# Patient Record
Sex: MALE | Race: OTHER
[De-identification: names, ages, dates, MRNs, and addresses within clinical notes are randomized per-mention and may not be internally consistent; named-entity substitution may affect disease eponyms.]

---

## 2017-03-31 ENCOUNTER — HOSPITAL ENCOUNTER (OUTPATIENT)
Dept: HOSPITAL 23 - CGUS | Age: 27
Discharge: HOME | End: 2017-03-31
Payer: COMMERCIAL

## 2017-03-31 DIAGNOSIS — R74.8: Primary | ICD-10-CM

## 2019-02-04 ENCOUNTER — OFFICE VISIT (OUTPATIENT)
Dept: FAMILY MEDICINE CLINIC | Facility: CLINIC | Age: 29
End: 2019-02-04

## 2019-02-04 VITALS
SYSTOLIC BLOOD PRESSURE: 128 MMHG | OXYGEN SATURATION: 98 % | HEIGHT: 68 IN | TEMPERATURE: 98.6 F | HEART RATE: 84 BPM | WEIGHT: 182.4 LBS | RESPIRATION RATE: 16 BRPM | DIASTOLIC BLOOD PRESSURE: 66 MMHG | BODY MASS INDEX: 27.65 KG/M2

## 2019-02-04 DIAGNOSIS — R00.2 HEART PALPITATIONS: ICD-10-CM

## 2019-02-04 DIAGNOSIS — Z20.2 POSSIBLE EXPOSURE TO STD: Primary | ICD-10-CM

## 2019-02-04 DIAGNOSIS — Z13.220 SCREENING FOR HYPERLIPIDEMIA: ICD-10-CM

## 2019-02-04 DIAGNOSIS — R94.31 ABNORMAL EKG: ICD-10-CM

## 2019-02-04 PROCEDURE — 99204 OFFICE O/P NEW MOD 45 MIN: CPT | Performed by: NURSE PRACTITIONER

## 2019-02-04 PROCEDURE — 93000 ELECTROCARDIOGRAM COMPLETE: CPT | Performed by: NURSE PRACTITIONER

## 2019-02-04 RX ORDER — MELATONIN
1000 DAILY
COMMUNITY

## 2019-02-04 RX ORDER — MULTIPLE VITAMINS W/ MINERALS TAB 9MG-400MCG
1 TAB ORAL DAILY
COMMUNITY

## 2019-02-04 RX ORDER — CHLORAL HYDRATE 500 MG
CAPSULE ORAL
COMMUNITY

## 2019-02-04 NOTE — PROGRESS NOTES
Subjective   Elva Jasmine is a 28 y.o. male.     Chief Complaint   Patient presents with   • Heart Problem     EST care      Mr. Jasmine presents today to establish care at this practice. He is concerned today because starting last Thursday (1/31/19) he has been having intermittent episodes where he feels like his heart is skipping a beat, when this happens he fills like he needs to take a deep breath but can't. He denies chest pain. He reports a significant family history of heart disease and reports a personal history of HTN which is diet controled. He is currently taking supplemental vitamins.   His other concern is that he recently ended a relationship and he reports there was infidelity involved. He would like to be evaluated for STDs.    I have reviewed the patient's medical history in detail and updated the computerized patient record.     The following portions of the patient's history were reviewed and updated as appropriate: allergies, current medications, past family history, past medical history, past social history, past surgical history and problem list.       Current Outpatient Medications:   •  cholecalciferol (VITAMIN D3) 1000 units tablet, Take 1,000 Units by mouth Daily., Disp: , Rfl:   •  Multiple Vitamins-Minerals (MULTIVITAMIN WITH MINERALS) tablet tablet, Take 1 tablet by mouth Daily., Disp: , Rfl:   •  Omega-3 Fatty Acids (FISH OIL) 1000 MG capsule capsule, Take  by mouth Daily With Breakfast., Disp: , Rfl:     Review of Systems   Constitutional: Negative.    HENT: Negative.    Respiratory: Negative.    Cardiovascular: Positive for palpitations (skipped beats). Negative for chest pain and leg swelling.   Gastrointestinal: Negative.    Genitourinary: Negative.    Musculoskeletal: Negative.    Skin: Negative.    Neurological: Negative.    Psychiatric/Behavioral: Negative.         Vitals:    02/04/19 1452   BP: 128/66   Pulse: 84   Resp: 16   Temp: 98.6 °F (37 °C)   SpO2: 98%       Objective    Physical Exam   Constitutional: He is oriented to person, place, and time. He appears well-developed and well-nourished.   HENT:   Right Ear: External ear normal.   Left Ear: External ear normal.   Mouth/Throat: Oropharynx is clear and moist.   Neck: Normal range of motion. Neck supple. No JVD present. No thyromegaly present.   Cardiovascular: Normal rate, regular rhythm, normal heart sounds and intact distal pulses.   Pulmonary/Chest: Effort normal and breath sounds normal.   Abdominal: Soft. Bowel sounds are normal.   Musculoskeletal: Normal range of motion.   Lymphadenopathy:     He has no cervical adenopathy.   Neurological: He is alert and oriented to person, place, and time.   Skin: Skin is warm and dry.   Psychiatric:   No acute distress   Vitals reviewed.        Assessment/Plan   Elva was seen today for heart problem.    Diagnoses and all orders for this visit:    Possible exposure to STD  -     Chlamydia trachomatis, Neisseria gonorrhoeae, PCR - Swab, Urine, Clean Catch  -     RPR  -     Hepatitis C RNA, quantitative, PCR (graph)  -     Hepatitis panel, acute  -     HIV 2 Antibody Screen w reflex  -     HSV 1 and 2 IgM, Abs, Indirect  -     HSV 1 and 2-Specific Ab, IgG    Heart palpitations  -     Thyroid Peroxidase Antibody  -     TSH  -     T4, Free  -     T4  -     T3, Free  -     T3  -     CBC (No Diff)  -     Comprehensive Metabolic Panel  -     ECG 12 Lead    Screening for hyperlipidemia  -     Lipid Panel With / Chol / HDL Ratio    Abnormal EKG  -     Stress Test With Myocardial Perfusion One Day; Future    1. He has not had a PCP in many years so there are no medical records to obtain.  2. I have reviewed his EKG which shows NSR with left ventricular hypertrophy and ST-T changes. I have no other EKG to compare this too. I will send him for a nuclear cardiac stress test for further evaluation.  3. I have also ordered an STD panel, thyroid studies, CMP, CBC and lipids. He is to return later this  week when he is fasting to have labs drawn.   4. Follow up as needed.

## 2019-02-08 ENCOUNTER — HOSPITAL ENCOUNTER (OUTPATIENT)
Dept: CARDIOLOGY | Facility: HOSPITAL | Age: 29
Discharge: HOME | End: 2019-02-08

## 2019-02-08 ENCOUNTER — HOSPITAL ENCOUNTER (OUTPATIENT)
Dept: CARDIOLOGY | Facility: HOSPITAL | Age: 29
Discharge: HOME | End: 2019-02-08
Admitting: NURSE PRACTITIONER

## 2019-02-08 VITALS
DIASTOLIC BLOOD PRESSURE: 80 MMHG | HEIGHT: 68 IN | HEART RATE: 79 BPM | WEIGHT: 182 LBS | BODY MASS INDEX: 27.58 KG/M2 | SYSTOLIC BLOOD PRESSURE: 112 MMHG

## 2019-02-08 DIAGNOSIS — R94.31 ABNORMAL EKG: ICD-10-CM

## 2019-02-08 DIAGNOSIS — R94.31 ABNORMAL EKG: Primary | ICD-10-CM

## 2019-02-08 DIAGNOSIS — I49.3 PVC'S (PREMATURE VENTRICULAR CONTRACTIONS): ICD-10-CM

## 2019-02-08 DIAGNOSIS — R00.2 HEART PALPITATIONS: Primary | ICD-10-CM

## 2019-02-08 LAB
ASCENDING AORTA: 2.3 CM
BH CV ECHO MEAS - ACS: 2.2 CM
BH CV ECHO MEAS - AO MAX PG: 4.8 MMHG
BH CV ECHO MEAS - AO ROOT AREA (BSA CORRECTED): 1.6
BH CV ECHO MEAS - AO ROOT AREA: 8 CM^2
BH CV ECHO MEAS - AO ROOT DIAM: 3.2 CM
BH CV ECHO MEAS - AO V2 MAX: 109.9 CM/SEC
BH CV ECHO MEAS - BSA(HAYCOCK): 2 M^2
BH CV ECHO MEAS - BSA: 2 M^2
BH CV ECHO MEAS - BZI_BMI: 27.7 KILOGRAMS/M^2
BH CV ECHO MEAS - BZI_METRIC_HEIGHT: 172.7 CM
BH CV ECHO MEAS - BZI_METRIC_WEIGHT: 82.6 KG
BH CV ECHO MEAS - EDV(MOD-SP4): 122 ML
BH CV ECHO MEAS - EDV(TEICH): 116.9 ML
BH CV ECHO MEAS - EF(CUBED): 74.6 %
BH CV ECHO MEAS - EF(MOD-BP): 70 %
BH CV ECHO MEAS - EF(MOD-SP4): 69.7 %
BH CV ECHO MEAS - EF(TEICH): 66.3 %
BH CV ECHO MEAS - ESV(MOD-SP4): 37 ML
BH CV ECHO MEAS - ESV(TEICH): 39.4 ML
BH CV ECHO MEAS - FS: 36.7 %
BH CV ECHO MEAS - IVS/LVPW: 0.96
BH CV ECHO MEAS - IVSD: 0.89 CM
BH CV ECHO MEAS - LAT PEAK E' VEL: 12 CM/SEC
BH CV ECHO MEAS - LV DIASTOLIC VOL/BSA (35-75): 62.1 ML/M^2
BH CV ECHO MEAS - LV MASS(C)D: 159.8 GRAMS
BH CV ECHO MEAS - LV MASS(C)DI: 81.4 GRAMS/M^2
BH CV ECHO MEAS - LV SYSTOLIC VOL/BSA (12-30): 18.8 ML/M^2
BH CV ECHO MEAS - LVIDD: 5 CM
BH CV ECHO MEAS - LVIDS: 3.1 CM
BH CV ECHO MEAS - LVLD AP4: 8.2 CM
BH CV ECHO MEAS - LVLS AP4: 7.5 CM
BH CV ECHO MEAS - LVPWD: 0.93 CM
BH CV ECHO MEAS - MED PEAK E' VEL: 9 CM/SEC
BH CV ECHO MEAS - MR MAX PG: 13.2 MMHG
BH CV ECHO MEAS - MR MAX VEL: 181.9 CM/SEC
BH CV ECHO MEAS - MV A DUR: 0.11 SEC
BH CV ECHO MEAS - MV A MAX VEL: 44.1 CM/SEC
BH CV ECHO MEAS - MV DEC SLOPE: 171.8 CM/SEC^2
BH CV ECHO MEAS - MV DEC TIME: 0.33 SEC
BH CV ECHO MEAS - MV E MAX VEL: 57.2 CM/SEC
BH CV ECHO MEAS - MV E/A: 1.3
BH CV ECHO MEAS - MV P1/2T MAX VEL: 58.2 CM/SEC
BH CV ECHO MEAS - MV P1/2T: 99.2 MSEC
BH CV ECHO MEAS - MVA P1/2T LCG: 3.8 CM^2
BH CV ECHO MEAS - MVA(P1/2T): 2.2 CM^2
BH CV ECHO MEAS - PULM A REVS DUR: 0.1 SEC
BH CV ECHO MEAS - PULM A REVS VEL: 23.3 CM/SEC
BH CV ECHO MEAS - PULM DIAS VEL: 45.6 CM/SEC
BH CV ECHO MEAS - PULM S/D: 1
BH CV ECHO MEAS - PULM SYS VEL: 47.1 CM/SEC
BH CV ECHO MEAS - SI(CUBED): 46.8 ML/M^2
BH CV ECHO MEAS - SI(MOD-SP4): 43.3 ML/M^2
BH CV ECHO MEAS - SI(TEICH): 39.5 ML/M^2
BH CV ECHO MEAS - SV(CUBED): 91.9 ML
BH CV ECHO MEAS - SV(MOD-SP4): 85 ML
BH CV ECHO MEAS - SV(TEICH): 77.5 ML
BH CV ECHO MEAS - TAPSE (>1.6): 2.3 CM2
BH CV ECHO MEASUREMENTS AVERAGE E/E' RATIO: 5.45
BH CV STRESS BP STAGE 1: NORMAL
BH CV STRESS BP STAGE 2: NORMAL
BH CV STRESS BP STAGE 3: NORMAL
BH CV STRESS DURATION MIN STAGE 1: 3
BH CV STRESS DURATION MIN STAGE 2: 3
BH CV STRESS DURATION MIN STAGE 3: 3
BH CV STRESS DURATION MIN STAGE 4: 1
BH CV STRESS DURATION SEC STAGE 1: 0
BH CV STRESS DURATION SEC STAGE 2: 0
BH CV STRESS DURATION SEC STAGE 3: 0
BH CV STRESS DURATION SEC STAGE 4: 0
BH CV STRESS ECHO POST STRESS EJECTION FRACTION EF: 80 %
BH CV STRESS GRADE STAGE 1: 10
BH CV STRESS GRADE STAGE 2: 12
BH CV STRESS GRADE STAGE 3: 14
BH CV STRESS GRADE STAGE 4: 16
BH CV STRESS HR STAGE 1: 106
BH CV STRESS HR STAGE 2: 123
BH CV STRESS HR STAGE 3: 172
BH CV STRESS HR STAGE 4: 181
BH CV STRESS METS STAGE 1: 5
BH CV STRESS METS STAGE 2: 7.5
BH CV STRESS METS STAGE 3: 10
BH CV STRESS METS STAGE 4: 13.5
BH CV STRESS PROTOCOL 1: NORMAL
BH CV STRESS RATE STAGE 3: 181
BH CV STRESS SPEED STAGE 1: 1.7
BH CV STRESS SPEED STAGE 2: 2.5
BH CV STRESS SPEED STAGE 3: 3.4
BH CV STRESS SPEED STAGE 4: 4.2
BH CV STRESS STAGE 1: 1
BH CV STRESS STAGE 2: 2
BH CV STRESS STAGE 3: 3
BH CV STRESS STAGE 4: 4
BH CV XLRA - RV BASE: 2.6 CM
BH CV XLRA - TDI S': 12 CM/SEC
LEFT ATRIUM VOLUME INDEX: 19 ML/M2
MAXIMAL PREDICTED HEART RATE: 190 BPM
PERCENT MAX PREDICTED HR: 95.26 %
SINUS: 2.9 CM
STJ: 2.5 CM
STRESS BASELINE BP: NORMAL MMHG
STRESS BASELINE HR: 79 BPM
STRESS PERCENT HR: 112 %
STRESS POST ESTIMATED WORKLOAD: 11 METS
STRESS POST EXERCISE DUR MIN: 10 MIN
STRESS POST EXERCISE DUR SEC: 0 SEC
STRESS POST PEAK BP: NORMAL MMHG
STRESS POST PEAK HR: 181 BPM
STRESS TARGET HR: 162 BPM

## 2019-02-08 PROCEDURE — 93017 CV STRESS TEST TRACING ONLY: CPT

## 2019-02-08 PROCEDURE — 93018 CV STRESS TEST I&R ONLY: CPT | Performed by: INTERNAL MEDICINE

## 2019-02-08 PROCEDURE — 93016 CV STRESS TEST SUPVJ ONLY: CPT | Performed by: INTERNAL MEDICINE

## 2019-02-08 PROCEDURE — 93320 DOPPLER ECHO COMPLETE: CPT

## 2019-02-08 PROCEDURE — 93325 DOPPLER ECHO COLOR FLOW MAPG: CPT

## 2019-02-08 PROCEDURE — 93325 DOPPLER ECHO COLOR FLOW MAPG: CPT | Performed by: INTERNAL MEDICINE

## 2019-02-08 PROCEDURE — 93350 STRESS TTE ONLY: CPT | Performed by: INTERNAL MEDICINE

## 2019-02-08 PROCEDURE — 93350 STRESS TTE ONLY: CPT

## 2019-02-08 PROCEDURE — 93320 DOPPLER ECHO COMPLETE: CPT | Performed by: INTERNAL MEDICINE

## 2019-02-12 LAB
ALBUMIN SERPL-MCNC: 4.6 G/DL (ref 3.5–5.5)
ALBUMIN/GLOB SERPL: 1.8 {RATIO} (ref 1.2–2.2)
ALP SERPL-CCNC: 31 IU/L (ref 39–117)
ALT SERPL-CCNC: 40 IU/L (ref 0–44)
AST SERPL-CCNC: 23 IU/L (ref 0–40)
BILIRUB SERPL-MCNC: 1.3 MG/DL (ref 0–1.2)
BUN SERPL-MCNC: 13 MG/DL (ref 6–20)
BUN/CREAT SERPL: 12 (ref 9–20)
CALCIUM SERPL-MCNC: 9.6 MG/DL (ref 8.7–10.2)
CHLORIDE SERPL-SCNC: 103 MMOL/L (ref 96–106)
CHOLEST SERPL-MCNC: 170 MG/DL (ref 100–199)
CHOLEST/HDLC SERPL: 2.8 RATIO (ref 0–5)
CO2 SERPL-SCNC: 23 MMOL/L (ref 20–29)
CREAT SERPL-MCNC: 1.05 MG/DL (ref 0.76–1.27)
ERYTHROCYTE [DISTWIDTH] IN BLOOD BY AUTOMATED COUNT: 14.1 % (ref 12.3–15.4)
GLOBULIN SER CALC-MCNC: 2.6 G/DL (ref 1.5–4.5)
GLUCOSE SERPL-MCNC: 91 MG/DL (ref 65–99)
HAV IGM SERPL QL IA: NEGATIVE
HBV CORE IGM SERPL QL IA: NEGATIVE
HBV SURFACE AG SERPL QL IA: NEGATIVE
HCT VFR BLD AUTO: 47.5 % (ref 37.5–51)
HCV AB S/CO SERPL IA: <0.1 S/CO RATIO (ref 0–0.9)
HCV RNA SERPL NAA+PROBE-ACNC: NORMAL IU/ML
HDLC SERPL-MCNC: 61 MG/DL
HGB BLD-MCNC: 16.1 G/DL (ref 13–17.7)
HIV 2 AB SERPL QL IA: NEGATIVE
HSV1 IGG SER IA-ACNC: <0.91 INDEX (ref 0–0.9)
HSV1 IGM TITR SER IF: NORMAL TITER
HSV2 IGG SER IA-ACNC: <0.91 INDEX (ref 0–0.9)
HSV2 IGM TITR SER IF: NORMAL TITER
LDLC SERPL CALC-MCNC: 95 MG/DL (ref 0–99)
MCH RBC QN AUTO: 29.6 PG (ref 26.6–33)
MCHC RBC AUTO-ENTMCNC: 33.9 G/DL (ref 31.5–35.7)
MCV RBC AUTO: 87 FL (ref 79–97)
PLATELET # BLD AUTO: 219 X10E3/UL (ref 150–379)
POTASSIUM SERPL-SCNC: 4.5 MMOL/L (ref 3.5–5.2)
PROT SERPL-MCNC: 7.2 G/DL (ref 6–8.5)
RBC # BLD AUTO: 5.44 X10E6/UL (ref 4.14–5.8)
RPR SER QL: NON REACTIVE
SODIUM SERPL-SCNC: 143 MMOL/L (ref 134–144)
T3 SERPL-MCNC: 126 NG/DL (ref 71–180)
T3FREE SERPL-MCNC: 3.4 PG/ML (ref 2–4.4)
T4 FREE SERPL-MCNC: 1.42 NG/DL (ref 0.82–1.77)
T4 SERPL-MCNC: 7 UG/DL (ref 4.5–12)
TEST INFORMATION: NORMAL
THYROPEROXIDASE AB SERPL-ACNC: 12 IU/ML (ref 0–34)
TRIGL SERPL-MCNC: 68 MG/DL (ref 0–149)
TSH SERPL DL<=0.005 MIU/L-ACNC: 1.34 UIU/ML (ref 0.45–4.5)
VLDLC SERPL CALC-MCNC: 14 MG/DL (ref 5–40)
WBC # BLD AUTO: 10.3 X10E3/UL (ref 3.4–10.8)

## 2019-09-14 ENCOUNTER — HOSPITAL ENCOUNTER (EMERGENCY)
Facility: HOSPITAL | Age: 29
Discharge: HOME | End: 2019-09-15
Attending: EMERGENCY MEDICINE | Admitting: EMERGENCY MEDICINE

## 2019-09-14 ENCOUNTER — APPOINTMENT (OUTPATIENT)
Dept: GENERAL RADIOLOGY | Facility: HOSPITAL | Age: 29
End: 2019-09-14

## 2019-09-14 DIAGNOSIS — R07.89 ATYPICAL CHEST PAIN: Primary | ICD-10-CM

## 2019-09-14 LAB
ALBUMIN SERPL-MCNC: 4.2 G/DL (ref 3.5–5.2)
ALBUMIN/GLOB SERPL: 1.5 G/DL
ALP SERPL-CCNC: 35 U/L (ref 39–117)
ALT SERPL W P-5'-P-CCNC: 54 U/L (ref 1–41)
ANION GAP SERPL CALCULATED.3IONS-SCNC: 12.6 MMOL/L (ref 5–15)
AST SERPL-CCNC: 29 U/L (ref 1–40)
BASOPHILS # BLD AUTO: 0.09 10*3/MM3 (ref 0–0.2)
BASOPHILS NFR BLD AUTO: 1 % (ref 0–1.5)
BILIRUB SERPL-MCNC: 0.7 MG/DL (ref 0.2–1.2)
BUN BLD-MCNC: 14 MG/DL (ref 6–20)
BUN/CREAT SERPL: 13.1 (ref 7–25)
CALCIUM SPEC-SCNC: 8.9 MG/DL (ref 8.6–10.5)
CHLORIDE SERPL-SCNC: 102 MMOL/L (ref 98–107)
CO2 SERPL-SCNC: 24.4 MMOL/L (ref 22–29)
CREAT BLD-MCNC: 1.07 MG/DL (ref 0.76–1.27)
DEPRECATED RDW RBC AUTO: 41.6 FL (ref 37–54)
EOSINOPHIL # BLD AUTO: 0.08 10*3/MM3 (ref 0–0.4)
EOSINOPHIL NFR BLD AUTO: 0.9 % (ref 0.3–6.2)
ERYTHROCYTE [DISTWIDTH] IN BLOOD BY AUTOMATED COUNT: 12.8 % (ref 12.3–15.4)
GFR SERPL CREATININE-BSD FRML MDRD: 81 ML/MIN/1.73
GFR SERPL CREATININE-BSD FRML MDRD: 98 ML/MIN/1.73
GLOBULIN UR ELPH-MCNC: 2.8 GM/DL
GLUCOSE BLD-MCNC: 101 MG/DL (ref 65–99)
HCT VFR BLD AUTO: 45.3 % (ref 37.5–51)
HGB BLD-MCNC: 15 G/DL (ref 13–17.7)
IMM GRANULOCYTES # BLD AUTO: 0.02 10*3/MM3 (ref 0–0.05)
IMM GRANULOCYTES NFR BLD AUTO: 0.2 % (ref 0–0.5)
LYMPHOCYTES # BLD AUTO: 3.19 10*3/MM3 (ref 0.7–3.1)
LYMPHOCYTES NFR BLD AUTO: 36 % (ref 19.6–45.3)
MCH RBC QN AUTO: 29.3 PG (ref 26.6–33)
MCHC RBC AUTO-ENTMCNC: 33.1 G/DL (ref 31.5–35.7)
MCV RBC AUTO: 88.5 FL (ref 79–97)
MONOCYTES # BLD AUTO: 0.65 10*3/MM3 (ref 0.1–0.9)
MONOCYTES NFR BLD AUTO: 7.3 % (ref 5–12)
NEUTROPHILS # BLD AUTO: 4.82 10*3/MM3 (ref 1.7–7)
NEUTROPHILS NFR BLD AUTO: 54.6 % (ref 42.7–76)
NRBC BLD AUTO-RTO: 0 /100 WBC (ref 0–0.2)
PLATELET # BLD AUTO: 219 10*3/MM3 (ref 140–450)
PMV BLD AUTO: 10.4 FL (ref 6–12)
POTASSIUM BLD-SCNC: 3.8 MMOL/L (ref 3.5–5.2)
PROT SERPL-MCNC: 7 G/DL (ref 6–8.5)
RBC # BLD AUTO: 5.12 10*6/MM3 (ref 4.14–5.8)
SODIUM BLD-SCNC: 139 MMOL/L (ref 136–145)
TROPONIN T SERPL-MCNC: <0.01 NG/ML (ref 0–0.03)
WBC NRBC COR # BLD: 8.85 10*3/MM3 (ref 3.4–10.8)

## 2019-09-14 PROCEDURE — 80053 COMPREHEN METABOLIC PANEL: CPT | Performed by: EMERGENCY MEDICINE

## 2019-09-14 PROCEDURE — 93005 ELECTROCARDIOGRAM TRACING: CPT

## 2019-09-14 PROCEDURE — 71046 X-RAY EXAM CHEST 2 VIEWS: CPT

## 2019-09-14 PROCEDURE — 85025 COMPLETE CBC W/AUTO DIFF WBC: CPT | Performed by: EMERGENCY MEDICINE

## 2019-09-14 PROCEDURE — 93005 ELECTROCARDIOGRAM TRACING: CPT | Performed by: EMERGENCY MEDICINE

## 2019-09-14 PROCEDURE — 84484 ASSAY OF TROPONIN QUANT: CPT | Performed by: EMERGENCY MEDICINE

## 2019-09-14 PROCEDURE — 99284 EMERGENCY DEPT VISIT MOD MDM: CPT

## 2019-09-14 PROCEDURE — 93010 ELECTROCARDIOGRAM REPORT: CPT | Performed by: INTERNAL MEDICINE

## 2019-09-15 VITALS
OXYGEN SATURATION: 96 % | HEIGHT: 68 IN | DIASTOLIC BLOOD PRESSURE: 81 MMHG | HEART RATE: 74 BPM | TEMPERATURE: 98.9 F | BODY MASS INDEX: 29.1 KG/M2 | RESPIRATION RATE: 18 BRPM | WEIGHT: 192 LBS | SYSTOLIC BLOOD PRESSURE: 125 MMHG

## 2019-09-15 NOTE — ED NOTES
Pt reports around 2 pm after riding a bike around the neighborhood. Pt reports that left lightheaded, palpitation. Pt reports that he was at a wedding this evening he started having right shoulder pain every times he moves or takes a deep breath. Pt reports that he does get  Palpitations and has been seen for them before.      Jessica Liu RN  09/14/19 [id number]

## 2019-09-15 NOTE — ED PROVIDER NOTES
EMERGENCY DEPARTMENT ENCOUNTER    CHIEF COMPLAINT  Chief Complaint: Chest pain  History given by: Patient  History limited by: None  Room Number: 12/12  PMD: Rossana Smith, CLAUDIO      HPI:  Pt is a 29 y.o. male who presents complaining of intermittent chest pain that began at approx. 1400 today after exerting himself on a bicycle, but has resolved since onset. Pt also c/o light headedness and palpitations with onset of pain. He later developed R shoulder/scapula pain, exacerbated by deep inspiration, that has not gone away. Hx of PVCs.    Duration: Began at approx. 1400 today  Onset: Gradual  Timing: Intermittent  Location: Chest  Intensity/Severity: Moderate  Progression: Resolved since onset  Associated Symptoms: Light headedness and palpitations with onset of pain, R shoulder/spacula pain, exacerbated by deep inspiration  Previous Episodes: Hx of PVCs  Treatment before arrival: None stated    PAST MEDICAL HISTORY  Active Ambulatory Problems     Diagnosis Date Noted   • Drug screening, pre-employment 10/01/2016     Resolved Ambulatory Problems     Diagnosis Date Noted   • No Resolved Ambulatory Problems     Past Medical History:   Diagnosis Date   • Hypertension        PAST SURGICAL HISTORY  History reviewed. No pertinent surgical history.    FAMILY HISTORY  Family History   Problem Relation Age of Onset   • Diabetes Paternal Uncle    • Hypertension Paternal Uncle    • No Known Problems Mother    • No Known Problems Father    • No Known Problems Brother    • Diabetes Maternal Grandmother    • Stroke Maternal Grandfather    • Heart attack Maternal Grandfather    • Hypertension Maternal Grandfather    • Alcohol abuse Maternal Grandfather        SOCIAL HISTORY  Social History     Socioeconomic History   • Marital status: Single     Spouse name: Not on file   • Number of children: Not on file   • Years of education: Not on file   • Highest education level: Not on file   Tobacco Use   • Smoking status: Never Smoker    • Smokeless tobacco: Never Used   Substance and Sexual Activity   • Alcohol use: Yes     Frequency: 2-4 times a month     Comment: social   • Drug use: No   • Sexual activity: Yes     Partners: Female       ALLERGIES  Patient has no known allergies.    REVIEW OF SYSTEMS  Review of Systems   Constitutional: Negative for activity change, appetite change and fever.   HENT: Negative for congestion and sore throat.    Eyes: Negative.    Respiratory: Negative for cough and shortness of breath.    Cardiovascular: Positive for chest pain (intermittent, resolved) and palpitations (with onset of pain). Negative for leg swelling.   Gastrointestinal: Negative for abdominal pain, diarrhea and vomiting.   Endocrine: Negative.    Genitourinary: Negative for decreased urine volume and dysuria.   Musculoskeletal: Positive for myalgias (R shoulder/scapula pain, exacerbated by feep inspiration). Negative for neck pain.   Skin: Negative for rash and wound.   Allergic/Immunologic: Negative.    Neurological: Positive for light-headedness (with onset of pain). Negative for weakness, numbness and headaches.   Hematological: Negative.    Psychiatric/Behavioral: Negative.    All other systems reviewed and are negative.      PHYSICAL EXAM  ED Triage Vitals   Temp Heart Rate Resp BP SpO2   09/14/19 2211 09/14/19 2211 09/14/19 2211 09/14/19 2236 09/14/19 2211   98.9 °F (37.2 °C) 88 18 139/86 99 %      Temp src Heart Rate Source Patient Position BP Location FiO2 (%)   09/14/19 2211 09/14/19 2211 -- -- --   Tympanic Monitor          Physical Exam   Constitutional: He is oriented to person, place, and time. No distress.   HENT:   Head: Normocephalic and atraumatic.   Eyes: EOM are normal. Pupils are equal, round, and reactive to light.   Neck: Normal range of motion. Neck supple.   Cardiovascular: Normal rate, regular rhythm and normal heart sounds.   Pulmonary/Chest: Effort normal and breath sounds normal. No respiratory distress.   Abdominal:  Soft. There is no tenderness. There is no rebound and no guarding.   Musculoskeletal: Normal range of motion. He exhibits no edema.   Neurological: He is alert and oriented to person, place, and time. He has normal sensation and normal strength.   Skin: Skin is warm and dry.   Psychiatric: Mood and affect normal.   Nursing note and vitals reviewed.      LAB RESULTS  Lab Results (last 24 hours)     Procedure Component Value Units Date/Time    CBC & Differential [[id number]] Collected:  09/14/19 2234    Specimen:  Blood Updated:  09/14/19 2249    Narrative:       The following orders were created for panel order CBC & Differential.  Procedure                               Abnormality         Status                     ---------                               -----------         ------                     CBC Auto Differential[[id number]]        Abnormal            Final result                 Please view results for these tests on the individual orders.    Comprehensive Metabolic Panel [[id number]]  (Abnormal) Collected:  09/14/19 2234    Specimen:  Blood Updated:  09/14/19 2308     Glucose 101 mg/dL      BUN 14 mg/dL      Creatinine 1.07 mg/dL      Sodium 139 mmol/L      Potassium 3.8 mmol/L      Chloride 102 mmol/L      CO2 24.4 mmol/L      Calcium 8.9 mg/dL      Total Protein 7.0 g/dL      Albumin 4.20 g/dL      ALT (SGPT) 54 U/L      AST (SGOT) 29 U/L      Alkaline Phosphatase 35 U/L      Total Bilirubin 0.7 mg/dL      eGFR Non African Amer 81 mL/min/1.73      eGFR  African Amer 98 mL/min/1.73      Globulin 2.8 gm/dL      A/G Ratio 1.5 g/dL      BUN/Creatinine Ratio 13.1     Anion Gap 12.6 mmol/L     Narrative:       GFR Normal >60  Chronic Kidney Disease <60  Kidney Failure <15    CBC Auto Differential [[id number]]  (Abnormal) Collected:  09/14/19 2234    Specimen:  Blood Updated:  09/14/19 2249     WBC 8.85 10*3/mm3      RBC 5.12 10*6/mm3      Hemoglobin 15.0 g/dL      Hematocrit 45.3 %      MCV 88.5 fL      MCH 29.3  pg      MCHC 33.1 g/dL      RDW 12.8 %      RDW-SD 41.6 fl      MPV 10.4 fL      Platelets 219 10*3/mm3      Neutrophil % 54.6 %      Lymphocyte % 36.0 %      Monocyte % 7.3 %      Eosinophil % 0.9 %      Basophil % 1.0 %      Immature Grans % 0.2 %      Neutrophils, Absolute 4.82 10*3/mm3      Lymphocytes, Absolute 3.19 10*3/mm3      Monocytes, Absolute 0.65 10*3/mm3      Eosinophils, Absolute 0.08 10*3/mm3      Basophils, Absolute 0.09 10*3/mm3      Immature Grans, Absolute 0.02 10*3/mm3      nRBC 0.0 /100 WBC     Troponin [[id number]]  (Normal) Collected:  09/14/19 2234    Specimen:  Blood Updated:  09/14/19 2341     Troponin T <0.010 ng/mL     Narrative:       Troponin T Reference Range:  <= 0.03 ng/mL-   Negative for AMI  >0.03 ng/mL-     Abnormal for myocardial necrosis.  Clinicians would have to utilize clinical acumen, EKG, Troponin and serial changes to determine if it is an Acute Myocardial Infarction or myocardial injury due to an underlying chronic condition.           I ordered the above labs and reviewed the results    RADIOLOGY  XR Chest 2 View   Final Result   No acute findings.       This report was finalized on 9/14/2019 11:21 PM by Dr. Valerie Carr M.D.               I ordered the above noted radiological studies. Interpreted by radiologist.     PROCEDURES  EKG          EKG time: 2218  Rhythm/Rate: NSR rate 83  P waves and VT: Nml P, Nml EMILY  QRS, axis: Nml QRS and axis   ST and T waves: No ST or T wave abnormalities     Interpreted Contemporaneously by me, independently viewed  Unchanged compared to prior in February of 2019.    PROGRESS AND CONSULTS     2323 Ordered troponin for further evaluation.    0021 Rechecked with pt, who is resting comfortably, and informed him of the results of his work up. Plan to discharge. He should f/u with cardiology. Pt understands and agrees with the plan, all questions answered.    MEDICAL DECISION MAKING  Results were reviewed/discussed with the patient  and they were also made aware of online access. Pt also made aware that some labs, such as cultures, will not be resulted during ER visit and follow up with PMD is necessary.     MDM  Number of Diagnoses or Management Options     Amount and/or Complexity of Data Reviewed  Clinical lab tests: ordered and reviewed (Troponin= <0.010)  Tests in the radiology section of CPT®: reviewed (CXR shows no acute findings.)  Tests in the medicine section of CPT®: reviewed (See procedure notes for EKG.)  Decide to obtain previous medical records or to obtain history from someone other than the patient: yes  Review and summarize past medical records: yes (No pertinent old records.)    Patient Progress  Patient progress: stable         DIAGNOSIS  Final diagnoses:   Atypical chest pain       DISPOSITION  DISCHARGE    Patient discharged in stable condition.    Reviewed implications of results, diagnosis, meds, responsibility to follow up, warning signs and symptoms of possible worsening, potential complications and reasons to return to ER, including new or worsening sxs.    Patient/Family voiced understanding of above instructions.    Discussed plan for discharge, as there is no emergent indication for admission. Patient referred to primary care provider for BP management due to today's BP. Pt/family is agreeable and understands need for follow up and repeat testing.  Pt is aware that discharge does not mean that nothing is wrong but it indicates no emergency is present that requires admission and they must continue care with follow-up as given below or physician of their choice.     FOLLOW-UP  Rossana Smith, APRN  [address] [address]  [phone]    Schedule an appointment as soon as possible for a visit       [facility] CARDIOLOGY  [address]  [facility] [address] [phone]  Schedule an appointment as soon as possible for a visit             Medication List      No changes were made to your prescriptions during this visit.     Latest Documented Vital Signs:  As of 6:32 AM  BP- 125/81 HR- 74 Temp- 98.9 °F (37.2 °C) (Tympanic) O2 sat- 96%    --  Documentation assistance provided by kourtney Gray MD for Dr. Gray.  Information recorded by the [email or web address] was done at my direction and has been verified and validated by me.     Elin Larson  09/15/19 0058       Alexander Gray MD  09/15/19 0678

## 2019-09-19 ENCOUNTER — OFFICE VISIT (OUTPATIENT)
Dept: CARDIOLOGY | Facility: CLINIC | Age: 29
End: 2019-09-19

## 2019-09-19 VITALS
WEIGHT: 189 LBS | DIASTOLIC BLOOD PRESSURE: 82 MMHG | HEIGHT: 68 IN | HEART RATE: 71 BPM | SYSTOLIC BLOOD PRESSURE: 150 MMHG | BODY MASS INDEX: 28.64 KG/M2

## 2019-09-19 DIAGNOSIS — R94.31 ABNORMAL EKG: ICD-10-CM

## 2019-09-19 DIAGNOSIS — R00.2 PALPITATIONS: ICD-10-CM

## 2019-09-19 DIAGNOSIS — R07.2 PRECORDIAL PAIN: Primary | ICD-10-CM

## 2019-09-19 PROBLEM — R06.09 DYSPNEA ON EXERTION: Status: ACTIVE | Noted: 2019-09-19

## 2019-09-19 PROCEDURE — 99204 OFFICE O/P NEW MOD 45 MIN: CPT | Performed by: INTERNAL MEDICINE

## 2019-09-19 PROCEDURE — 93000 ELECTROCARDIOGRAM COMPLETE: CPT | Performed by: INTERNAL MEDICINE

## 2019-09-19 NOTE — PROGRESS NOTES
Subjective:     Encounter Date:09/19/2019      Patient ID: Elva Jasmine is a 29 y.o. male.    Chief Complaint:  History of Present Illness    This is a 29-year-old with no past medical history who presents for evaluation of chest pain and palpitations.    Patient reports that he began having issues with palpitations earlier in the year.  Episodes occur at least 2 or 3 times a week and can occur multiple times a day.  Reports that it feels like both heart racing and skipped heartbeats.  His palpitations do worsen with increased caffeine intake.  As part of his evaluation he underwent a stress echocardiogram earlier in the year.  This showed no evidence of ischemia and the patient was able to exercise for 10 minutes.  His baseline echocardiogram showed normal left ventricular systolic function and wall motion with an EF of 65%, normal diastolic function, normal left ventricular thickness and size, and no significant valvular disease.  In the last couple months he reports he is been having episodes of chest pain.  Describes it as like a poking sensation that can occur different parts of his chest sometimes with radiation down his right arm and some tingling in his right fifth digit.  Each episode of discomfort sounds like it only last a few seconds at a time but can recur several times.  Symptoms can occur both at rest and sometimes with activity.  He also reports overall decreased exercise tolerance.  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  After he finished training he stopped exercising altogether and has gained back 20 pounds.  He is trying to run or even right a bicycle for short distance and developed fatigued and out of breath with that activity.  He reports that recently when he tried riding a bike he has some chest discomfort.  He denies any family history of premature coronary artery disease.      Review of Systems   Constitution: Positive for malaise/fatigue  and weight gain. Negative for weakness.   HENT: Negative for hearing loss, hoarse voice, nosebleeds and sore throat.    Eyes: Negative for pain.   Cardiovascular: Positive for chest pain and palpitations. Negative for claudication, cyanosis, dyspnea on exertion, irregular heartbeat, leg swelling, near-syncope, orthopnea, paroxysmal nocturnal dyspnea and syncope.   Respiratory: Negative for shortness of breath and snoring.    Endocrine: Negative for cold intolerance, heat intolerance, polydipsia, polyphagia and polyuria.   Skin: Negative for itching and rash.   Musculoskeletal: Negative for arthritis, falls, joint pain, joint swelling, muscle cramps, muscle weakness and myalgias.   Gastrointestinal: Negative for constipation, diarrhea, dysphagia, heartburn, hematemesis, hematochezia, melena, nausea and vomiting.   Genitourinary: Negative for frequency, hematuria and hesitancy.   Neurological: Negative for excessive daytime sleepiness, dizziness, headaches, light-headedness and numbness.   Psychiatric/Behavioral: Negative for depression. The patient is not nervous/anxious.           Current Outpatient Medications:   •  B Complex-C-Folic Acid (STRESS FORMULA) tablet, Take 1 tablet by mouth Daily., Disp: , Rfl:   •  cholecalciferol (VITAMIN D3) 1000 units tablet, Take 1,000 Units by mouth Daily., Disp: , Rfl:   •  Multiple Vitamins-Minerals (MULTIVITAMIN WITH MINERALS) tablet tablet, Take 1 tablet by mouth Daily., Disp: , Rfl:   •  Omega-3 Fatty Acids (FISH OIL) 1000 MG capsule capsule, Take  by mouth Daily With Breakfast., Disp: , Rfl:     Past Medical History:   Diagnosis Date   • Atypical chest pain    • Heart palpitations    • Hypertension      No past surgical history on file.    Family History   Problem Relation Age of Onset   • Diabetes Paternal Uncle    • Hypertension Paternal Uncle    • No Known Problems Mother    • No Known Problems Father    • No Known Problems Brother    • Diabetes Maternal Grandmother    •  "Stroke Maternal Grandfather    • Heart attack Maternal Grandfather    • Hypertension Maternal Grandfather    • Alcohol abuse Maternal Grandfather          Social History     Tobacco Use   • Smoking status: Never Smoker   • Smokeless tobacco: Never Used   Substance Use Topics   • Alcohol use: Yes     Frequency: 2-4 times a month     Comment: social   • Drug use: No           ECG 12 Lead  Date/Time: 9/19/2019 4:41 PM  Performed by: Basia Lopez MD  Authorized by: Basia Lopez MD   Comparison: compared with previous ECG   Rhythm: sinus rhythm  Other findings: left ventricular hypertrophy with strain               Objective:         Visit Vitals  /82   Pulse 71   Ht 172.7 cm (68\")   Wt 85.7 kg (189 lb)   BMI 28.74 kg/m²          Physical Exam   Constitutional: He is oriented to person, place, and time. He appears well-developed and well-nourished.   HENT:   Head: Normocephalic and atraumatic.   Neck: No JVD present. Carotid bruit is not present.   Cardiovascular: Normal rate, regular rhythm, S1 normal and S2 normal. Exam reveals no gallop.   No murmur heard.  Pulses:       Radial pulses are 2+ on the right side, and 2+ on the left side.   No bilateral lower extremity edema   Pulmonary/Chest: Effort normal and breath sounds normal.   Abdominal: Soft. Normal appearance.   Neurological: He is alert and oriented to person, place, and time.   Skin: Skin is warm, dry and intact.   Psychiatric: He has a normal mood and affect.       Lab Review:       Assessment:          Diagnosis Plan   1. Precordial pain     2. Palpitations  Holter Monitor - 72 Hour Up To 21 Days          Plan:       1.  Chest pain.  Symptoms sound atypical.  It is reassuring that he had a normal stress test earlier this year and had no evidence of structural heart disease on his echocardiogram.  I explained the findings of his stress echocardiogram in detail.  However his EKG is abnormal showing findings of left ventricular hypertrophy with " repolarization abnormalities.  I do not have an explanation for these findings with reportedly normal left ventricular thickness on his echocardiogram.  I think we should proceed with a cardiac MRI to rule out any further structural heart issues and coronary anomalies.  2.  Palpitations.  We will proceed with a ZIO monitor to determine what kind of rhythm issues he may be having with his symptoms.    We will call and discuss the findings of his cardiac MRI and his monitor and give further recommendations based on those results.

## 2019-09-23 ENCOUNTER — OFFICE VISIT (OUTPATIENT)
Dept: FAMILY MEDICINE CLINIC | Facility: CLINIC | Age: 29
End: 2019-09-23

## 2019-09-23 VITALS
TEMPERATURE: 98.5 F | BODY MASS INDEX: 28.49 KG/M2 | HEIGHT: 68 IN | SYSTOLIC BLOOD PRESSURE: 138 MMHG | OXYGEN SATURATION: 98 % | DIASTOLIC BLOOD PRESSURE: 74 MMHG | HEART RATE: 72 BPM | WEIGHT: 188 LBS

## 2019-09-23 DIAGNOSIS — Z83.3 FH: DIABETES MELLITUS: ICD-10-CM

## 2019-09-23 DIAGNOSIS — R06.09 DYSPNEA ON EXERTION: ICD-10-CM

## 2019-09-23 DIAGNOSIS — R00.2 PALPITATIONS: ICD-10-CM

## 2019-09-23 DIAGNOSIS — F41.9 ANXIETY: ICD-10-CM

## 2019-09-23 DIAGNOSIS — R42 DIZZINESS, NONSPECIFIC: ICD-10-CM

## 2019-09-23 DIAGNOSIS — R07.2 PRECORDIAL PAIN: Primary | ICD-10-CM

## 2019-09-23 PROCEDURE — 99214 OFFICE O/P EST MOD 30 MIN: CPT | Performed by: NURSE PRACTITIONER

## 2019-09-23 NOTE — PROGRESS NOTES
Subjective   Elva Jasmine is a 29 y.o. male who presents as a new patient c/o palpitations, dizziness. Has been following with cardiology and is currently wearing a holter. Wants to check for other causes of chest pain as diabetes runs in family.     History of Present Illness   Complaints of symptoms of heart palpitations and chest pain and lightheadedness occurring at work along with heart racing. Can occur outside of work as well. Diagnosed with anxiety attacks. Has cut down of caffeine, started yoga and meditation, heart palpitations have become more rare. Can get dizzy off balance and nauseated. Restarted caffeine and palpitations recurred. 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 Can no longer run. When tries to run now has chest pain/heartburn. Worried as heartburn and dizziness are new. Sleepiness is new occurring after an episode. R neck pain is worse with deep breaths. Worried workup was not focused on heart attack.  Pain in armpits shooting to tingling in fingertips.   Has never been treated for anxiety. doesn't feel stressed. Work is chaotic. Co parent with daughter's mom, doesn't want to be on medication.   The following portions of the patient's history were reviewed and updated as appropriate: allergies, current medications, past family history, past medical history, past social history, past surgical history and problem list.    Review of Systems   Constitutional: Positive for fatigue.   Respiratory: Positive for chest tightness and shortness of breath.    Cardiovascular: Positive for chest pain and palpitations.   Gastrointestinal: Positive for nausea and indigestion.   Musculoskeletal: Positive for arthralgias and neck pain.   Allergic/Immunologic: Negative.  Negative for environmental allergies.   Neurological: Positive for dizziness.   Hematological: Negative.    Psychiatric/Behavioral: The patient is nervous/anxious.      /74   Pulse 72   Temp 98.5 °F (36.9 °C) (Oral)   " Ht 172.7 cm (68\")   Wt 85.3 kg (188 lb)   SpO2 98%   BMI 28.59 kg/m²     Objective   Physical Exam   Constitutional: He is oriented to person, place, and time. He appears well-developed and well-nourished.   HENT:   Right Ear: Tympanic membrane normal.   Left Ear: Tympanic membrane normal.   Neck: Normal range of motion. Neck supple. No tracheal deviation present. No thyromegaly present.   Cardiovascular: Normal rate, regular rhythm and normal heart sounds.   Pulses:       Carotid pulses are 2+ on the right side, and 2+ on the left side.  Pulmonary/Chest: Effort normal and breath sounds normal. He exhibits no tenderness.   Abdominal: Soft. Bowel sounds are normal. He exhibits no distension. There is no tenderness.   Lymphadenopathy:     He has no cervical adenopathy.     He has no axillary adenopathy.   Neurological: He is alert and oriented to person, place, and time.   aaron garcia pike negative   Skin: Skin is warm and dry.   Psychiatric: His behavior is normal. Judgment and thought content normal. His mood appears anxious. His speech is rapid and/or pressured.   Nursing note and vitals reviewed.    Assessment/Plan   Problems Addressed this Visit        Cardiovascular and Mediastinum    Palpitations    Relevant Orders    TSH    Comprehensive Metabolic Panel    Hemoglobin A1c       Respiratory    Dyspnea on exertion       Nervous and Auditory    Precordial pain - Primary      Other Visit Diagnoses     Dizziness, nonspecific        Anxiety        FH: diabetes mellitus        Relevant Orders    Hemoglobin A1c        Reviewed recent ER records and random glucose 101, troponin negative, stress echo in February with no evidence of ischemia, recent evaluation per Dr. Lopez. Pt. Worried a more organic cause. Reviewed with him all recent testing. Recommend check thyroid as palpitations, to complete evaluation. Numbers for counseling given. Recommend FU 1 month after completes cardiac MRI, zio patch.          "

## 2019-09-24 LAB
ALBUMIN SERPL-MCNC: 4.8 G/DL (ref 3.5–5.2)
ALBUMIN/GLOB SERPL: 1.9 G/DL
ALP SERPL-CCNC: 31 U/L (ref 39–117)
ALT SERPL-CCNC: 33 U/L (ref 1–41)
AST SERPL-CCNC: 16 U/L (ref 1–40)
BILIRUB SERPL-MCNC: 1 MG/DL (ref 0.2–1.2)
BUN SERPL-MCNC: 17 MG/DL (ref 6–20)
BUN/CREAT SERPL: 18.1 (ref 7–25)
CALCIUM SERPL-MCNC: 9.6 MG/DL (ref 8.6–10.5)
CHLORIDE SERPL-SCNC: 101 MMOL/L (ref 98–107)
CO2 SERPL-SCNC: 28.3 MMOL/L (ref 22–29)
CREAT SERPL-MCNC: 0.94 MG/DL (ref 0.76–1.27)
GLOBULIN SER CALC-MCNC: 2.5 GM/DL
GLUCOSE SERPL-MCNC: 92 MG/DL (ref 65–99)
HBA1C MFR BLD: 5.6 % (ref 4.8–5.6)
POTASSIUM SERPL-SCNC: 4.8 MMOL/L (ref 3.5–5.2)
PROT SERPL-MCNC: 7.3 G/DL (ref 6–8.5)
SODIUM SERPL-SCNC: 140 MMOL/L (ref 136–145)
TSH SERPL DL<=0.005 MIU/L-ACNC: 1.3 UIU/ML (ref 0.27–4.2)

## 2019-09-27 ENCOUNTER — TRANSCRIBE ORDERS (OUTPATIENT)
Dept: CARDIOLOGY | Facility: CLINIC | Age: 29
End: 2019-09-27

## 2019-09-27 DIAGNOSIS — R00.2 PALPITATIONS: ICD-10-CM

## 2019-09-27 DIAGNOSIS — R07.2 PRECORDIAL PAIN: Primary | ICD-10-CM

## 2019-09-30 ENCOUNTER — TELEPHONE (OUTPATIENT)
Dept: CARDIOLOGY | Facility: CLINIC | Age: 29
End: 2019-09-30

## 2019-09-30 NOTE — TELEPHONE ENCOUNTER
Just let him know that I will look at the monitor as soon as I get the results and let him know the results.  I can not really suggest anything until I take a look at the monitor.

## 2019-09-30 NOTE — TELEPHONE ENCOUNTER
Patient called today to inform you that his holter monitor that he had placed on 9/19 came off over the weekend.  He is actually sending the monitor in today, but wants to know if there is anything else he needs to do.    He is complaining of more frequent palpitations.    Please advise or call patient at [phone].

## 2019-10-08 ENCOUNTER — TELEPHONE (OUTPATIENT)
Dept: CARDIOLOGY | Facility: CLINIC | Age: 29
End: 2019-10-08

## 2019-10-08 DIAGNOSIS — R00.2 PALPITATIONS: Primary | ICD-10-CM

## 2019-10-08 RX ORDER — ATENOLOL 25 MG/1
12.5 TABLET ORAL DAILY
Qty: 30 TABLET | Refills: 3 | OUTPATIENT
Start: 2019-10-08 | End: 2020-01-11

## 2019-10-08 NOTE — TELEPHONE ENCOUNTER
Called regarding the results of his zio monitor which showed rare PACs and PVCs that intermittently were associated with his symptoms.  I reassured him that these were benign but did offer trying a beta blocker for his symptoms.  The patient would like to start one.   Will start atenolol 12.5 mg daily.  Will call the patient back once cardiac MRI is complete.

## 2019-10-23 ENCOUNTER — LAB (OUTPATIENT)
Dept: LAB | Facility: HOSPITAL | Age: 29
End: 2019-10-23

## 2019-10-23 ENCOUNTER — TELEPHONE (OUTPATIENT)
Dept: CARDIOLOGY | Facility: CLINIC | Age: 29
End: 2019-10-23

## 2019-10-23 DIAGNOSIS — R00.2 PALPITATIONS: ICD-10-CM

## 2019-10-23 LAB
ANION GAP SERPL CALCULATED.3IONS-SCNC: 9.7 MMOL/L (ref 5–15)
BUN BLD-MCNC: 15 MG/DL (ref 6–20)
BUN/CREAT SERPL: 15.5 (ref 7–25)
CALCIUM SPEC-SCNC: 9.1 MG/DL (ref 8.6–10.5)
CHLORIDE SERPL-SCNC: 102 MMOL/L (ref 98–107)
CO2 SERPL-SCNC: 26.3 MMOL/L (ref 22–29)
CREAT BLD-MCNC: 0.97 MG/DL (ref 0.76–1.27)
GFR SERPL CREATININE-BSD FRML MDRD: 109 ML/MIN/1.73
GFR SERPL CREATININE-BSD FRML MDRD: 90 ML/MIN/1.73
GLUCOSE BLD-MCNC: 89 MG/DL (ref 65–99)
POTASSIUM BLD-SCNC: 3.9 MMOL/L (ref 3.5–5.2)
SODIUM BLD-SCNC: 138 MMOL/L (ref 136–145)

## 2019-10-23 PROCEDURE — 80048 BASIC METABOLIC PNL TOTAL CA: CPT

## 2019-10-23 PROCEDURE — 36415 COLL VENOUS BLD VENIPUNCTURE: CPT

## 2019-10-23 NOTE — TELEPHONE ENCOUNTER
Patient is scheduled for cardiac MRI on Friday 10/25 @ 7:45 AM and he is asking for the instructions again.    Can someone please call him at [phone].    Thank you!

## 2019-10-25 ENCOUNTER — HOSPITAL ENCOUNTER (OUTPATIENT)
Dept: MRI IMAGING | Facility: HOSPITAL | Age: 29
Discharge: HOME | End: 2019-10-25
Admitting: INTERNAL MEDICINE

## 2019-10-25 LAB — CREAT BLDA-MCNC: 0.9 MG/DL (ref 0.6–1.3)

## 2019-10-25 PROCEDURE — A9577 INJ MULTIHANCE: HCPCS | Performed by: INTERNAL MEDICINE

## 2019-10-25 PROCEDURE — 75561 CARDIAC MRI FOR MORPH W/DYE: CPT

## 2019-10-25 PROCEDURE — 0 GADOBENATE DIMEGLUMINE 529 MG/ML SOLUTION: Performed by: INTERNAL MEDICINE

## 2019-10-25 PROCEDURE — 82565 ASSAY OF CREATININE: CPT

## 2019-10-25 PROCEDURE — 75561 CARDIAC MRI FOR MORPH W/DYE: CPT | Performed by: INTERNAL MEDICINE

## 2019-10-25 RX ADMIN — GADOBENATE DIMEGLUMINE 20 ML: 529 INJECTION, SOLUTION INTRAVENOUS at 09:15

## 2020-01-11 ENCOUNTER — HOSPITAL ENCOUNTER (EMERGENCY)
Facility: HOSPITAL | Age: 30
Discharge: HOME | End: 2020-01-11
Attending: EMERGENCY MEDICINE | Admitting: EMERGENCY MEDICINE

## 2020-01-11 VITALS
DIASTOLIC BLOOD PRESSURE: 71 MMHG | BODY MASS INDEX: 28.34 KG/M2 | WEIGHT: 187 LBS | HEART RATE: 80 BPM | HEIGHT: 68 IN | SYSTOLIC BLOOD PRESSURE: 121 MMHG | TEMPERATURE: 99.7 F | OXYGEN SATURATION: 96 % | RESPIRATION RATE: 16 BRPM

## 2020-01-11 DIAGNOSIS — S86.012A ACHILLES TENDON RUPTURE, LEFT, INITIAL ENCOUNTER: Primary | ICD-10-CM

## 2020-01-11 PROCEDURE — 99283 EMERGENCY DEPT VISIT LOW MDM: CPT

## 2020-01-11 RX ORDER — NAPROXEN 500 MG/1
500 TABLET ORAL 2 TIMES DAILY WITH MEALS
Qty: 30 TABLET | Refills: 0 | Status: SHIPPED | OUTPATIENT
Start: 2020-01-11 | End: 2020-01-17 | Stop reason: HOSPADM

## 2020-01-11 NOTE — ED PROVIDER NOTES
" EMERGENCY DEPARTMENT ENCOUNTER    Room Number:  07/07  Date of encounter:  1/11/2020  PCP: Judy Allen APRN  Historian: Patient       HPI:  Chief Complaint: Left lower extremity pain  A complete HPI/ROS/PMH/PSH/SH/FH are unobtainable due to: None    Context: Elva Jasmine is a 29 y.o. male who presents to the ED c/o left lower extremity pain which began abruptly while playing basketball earlier today.  Patient was trying to jump for a rebound and felt a pop in his left lower leg.  Afterwards he has had moderate to severe pain which is worsened by walking.  He denies other injury.  Patient states he is concerned that he \"ruptured his Achilles tendon\".      PAST MEDICAL HISTORY  Active Ambulatory Problems     Diagnosis Date Noted   • Drug screening, pre-employment 10/01/2016   • Precordial pain 09/19/2019   • Palpitations 09/19/2019   • Dyspnea on exertion 09/19/2019   • Abnormal EKG 09/19/2019     Resolved Ambulatory Problems     Diagnosis Date Noted   • No Resolved Ambulatory Problems     Past Medical History:   Diagnosis Date   • Atypical chest pain    • Heart palpitations    • Hypertension          PAST SURGICAL HISTORY  History reviewed. No pertinent surgical history.      FAMILY HISTORY  Family History   Problem Relation Age of Onset   • Diabetes Paternal Uncle    • Hypertension Paternal Uncle    • No Known Problems Mother    • No Known Problems Father    • No Known Problems Brother    • Diabetes Maternal Grandmother    • Stroke Maternal Grandfather    • Heart attack Maternal Grandfather    • Hypertension Maternal Grandfather    • Alcohol abuse Maternal Grandfather          SOCIAL HISTORY  Social History     Socioeconomic History   • Marital status: Single     Spouse name: Not on file   • Number of children: Not on file   • Years of education: Not on file   • Highest education level: Not on file   Tobacco Use   • Smoking status: Never Smoker   • Smokeless tobacco: Never Used   Substance and Sexual " Activity   • Alcohol use: Yes     Frequency: 2-4 times a month     Comment: social   • Drug use: No   • Sexual activity: Yes     Partners: Female         ALLERGIES  Patient has no known allergies.       REVIEW OF SYSTEMS  Review of Systems   Constitutional: Negative for fever.   Respiratory: Negative for shortness of breath.    Cardiovascular: Negative for chest pain.           PHYSICAL EXAM    I have reviewed the triage vital signs and nursing notes.    ED Triage Vitals   Temp Heart Rate Resp BP SpO2   01/11/20 1333 01/11/20 1333 01/11/20 1333 01/11/20 1339 01/11/20 1333   99.7 °F (37.6 °C) 115 18 118/73 96 %      Temp src Heart Rate Source Patient Position BP Location FiO2 (%)   -- -- -- -- --              Physical Exam  GENERAL: not distressed  HENT: nares patent  EYES: no scleral icterus  CV: regular rhythm, regular rate  RESPIRATORY: normal effort  ABDOMEN: soft  MUSCULOSKELETAL: Examination of the left lower extremity reveals diffuse swelling and tenderness to palpation at the proximal Achilles tendon.  There is no palpable defect.  Tam's test is negative for foot movement on the left.  It is normal on the right.  Distal strength sensation and pulses are all grossly intact.  Examination is highly suggestive of Achilles tendon rupture.  NEURO: Strength, sensation, and coordination are grossly intact.  Speech and mentation are unremarkable  SKIN: warm, dry        PROCEDURES  Procedures      MEDICATIONS GIVEN IN ER    Medications - No data to display      PROGRESS, DATA ANALYSIS, CONSULTS, AND MEDICAL DECISION MAKING    All labs have been independently reviewed by me.  All radiology studies have been reviewed by me and discussed with radiologist dictating the report.   EKG's independently viewed and interpreted by me.  Discussion below represents my analysis of pertinent findings related to patient's condition, differential diagnosis, treatment plan and final disposition.           AS OF 2:14 PM  VITALS:    BP - 118/73  HR - 115  TEMP - 99.7 °F (37.6 °C)  O2 SATS - 96%      DIAGNOSIS  Final diagnoses:   Achilles tendon rupture, left, initial encounter         DISPOSITION  DISCHARGE    Patient discharged in stable condition.    Reviewed implications of results, diagnosis, meds, responsibility to follow up, warning signs and symptoms of possible worsening, potential complications and reasons to return to ER, including increased pain or as needed.    Patient/Family voiced understanding of above instructions.    Discussed plan for discharge, as there is no emergent indication for admission. Patient referred to primary care provider for BP management due to today's BP. Pt/family is agreeable and understands need for follow up and repeat testing.  Pt is aware that discharge does not mean that nothing is wrong but it indicates no emergency is present that requires admission and they must continue care with follow-up as given below or physician of their choice.     FOLLOW-UP  Crystal Winters MD  [address] [address]  [phone]               Medication List      New Prescriptions    naproxen 500 MG tablet  Commonly known as:  NAPROSYN  Take 1 tablet by mouth 2 (Two) Times a Day With Meals.        Stop    atenolol 25 MG tablet  Commonly known as:  TENORMMaximiliano Selby MD  01/11/20 2931

## 2020-01-11 NOTE — DISCHARGE INSTRUCTIONS
You may bear weight on the left lower extremity only when wearing walking boot.  No weightbearing of the left lower extremity without protection of the Achilles tendon.

## 2020-01-13 ENCOUNTER — OFFICE VISIT (OUTPATIENT)
Dept: ORTHOPEDIC SURGERY | Facility: CLINIC | Age: 30
End: 2020-01-13

## 2020-01-13 ENCOUNTER — TELEPHONE (OUTPATIENT)
Dept: ORTHOPEDIC SURGERY | Facility: CLINIC | Age: 30
End: 2020-01-13

## 2020-01-13 VITALS — HEIGHT: 68 IN | WEIGHT: 187 LBS | TEMPERATURE: 98.6 F | BODY MASS INDEX: 28.34 KG/M2

## 2020-01-13 DIAGNOSIS — M25.572 ACUTE LEFT ANKLE PAIN: ICD-10-CM

## 2020-01-13 DIAGNOSIS — S86.012A ACHILLES TENDON TEAR, LEFT, INITIAL ENCOUNTER: Primary | ICD-10-CM

## 2020-01-13 PROCEDURE — 99204 OFFICE O/P NEW MOD 45 MIN: CPT | Performed by: ORTHOPAEDIC SURGERY

## 2020-01-13 PROCEDURE — 73610 X-RAY EXAM OF ANKLE: CPT | Performed by: ORTHOPAEDIC SURGERY

## 2020-01-13 RX ORDER — CEFAZOLIN SODIUM 2 G/100ML
2 INJECTION, SOLUTION INTRAVENOUS ONCE
Status: CANCELLED | OUTPATIENT
Start: 2020-01-17 | End: 2020-01-13

## 2020-01-13 NOTE — PROGRESS NOTES
"   New Patient Complaint      Patient: Elva Jasmine  YOB: 1990 29 y.o. male  Medical Record Number: [id number]    Chief Complaints: I hurt my Achilles    History of Present Illness: Patient injured his left Achilles on 1/11/2020 when playing basketball he said he went to \"post up\" and felt like somebody kicked him in the back of his left Achilles.  He was seen in the emergency room and had persistent complaints of mild intermittent aching pain with bruising and swelling worse with walking.  He got a boot and crutches but comes in today without using the crutches.    He had no prodromal symptoms of pain in the left Achilles.        HPI    Allergies: No Known Allergies    Medications:   Current Outpatient Medications on File Prior to Visit   Medication Sig   • B Complex-C-Folic Acid (STRESS FORMULA) tablet Take 1 tablet by mouth Daily.   • cholecalciferol (VITAMIN D3) 1000 units tablet Take 1,000 Units by mouth Daily.   • Multiple Vitamins-Minerals (MULTIVITAMIN WITH MINERALS) tablet tablet Take 1 tablet by mouth Daily.   • naproxen (NAPROSYN) 500 MG tablet Take 1 tablet by mouth 2 (Two) Times a Day With Meals.   • Omega-3 Fatty Acids (FISH OIL) 1000 MG capsule capsule Take  by mouth Daily With Breakfast.     No current facility-administered medications on file prior to visit.        Past Medical History:   Diagnosis Date   • Atypical chest pain    • Heart palpitations    • Hypertension      No past surgical history on file.  Social History     Occupational History   • Not on file   Tobacco Use   • Smoking status: Never Smoker   • Smokeless tobacco: Never Used   Substance and Sexual Activity   • Alcohol use: Yes     Frequency: 2-4 times a month     Comment: social   • Drug use: No   • Sexual activity: Yes     Partners: Female      Social History     Social History Narrative   • Not on file     Family History   Problem Relation Age of Onset   • Diabetes Paternal Uncle    • Hypertension Paternal Uncle    • " "No Known Problems Mother    • No Known Problems Father    • No Known Problems Brother    • Diabetes Maternal Grandmother    • Stroke Maternal Grandfather    • Heart attack Maternal Grandfather    • Hypertension Maternal Grandfather    • Alcohol abuse Maternal Grandfather        Review of Systems: 14 point review of systems performed, positive pertinent findings identified in HPI. All remaining systems negative except fatigue, chest pain and palpitations for which she is seen cardiology, nausea, frequent urination, dizziness and lightheadedness    Review of Systems      Physical Exam:   Vitals:    01/13/20 1605   Temp: 98.6 °F (37 °C)   Weight: 84.8 kg (187 lb)   Height: 172.7 cm (68\")     Physical Exam   Constitutional: pleasant, well developed   Eyes: sclera non icteric  Hearing : adequate for exam  Cardiovascular: palpable pulses in left foot, left calf/ thigh NT without sign of DVT  Respiratoy: breathing unlabored   Neurological: grossly sensate to LT throughout left LE  Psychiatric: oriented with normal mood and affect.   Lymphatic: No palpable popliteal lymphadenopathy left LE  Skin: intact throughout left leg/foot  Musculoskeletal: Examination left hindfoot shows mild swelling around the Achilles.  There is palpable defect of the mid substance of the Achilles with mild tenderness to palpation.  There is increased resting length on the left compared to the right and a positive Tam test on the left.  Nontender the left medial or lateral malleolus nor over the calcaneal insertion of the Achilles.  Physical Exam  Ortho Exam    Radiology: 3 views left ankle ordered evaluate pain reviewed and no prior x-rays available for comparison do not see obvious fracture about the ankle no obvious malalignment and no intratendinous calcifications along the Achilles.    Assessment/Plan: 1.  Left Achilles tendon rupture    We reviewed operative and nonoperative treatment options in detail and although no guarantees could be " given decision was made to proceed with operative treatment he voiced clear understanding the operative procedure and associated risks benefits potential outcomes and complications which can include but not limited to heart attack stroke death pneumonia infection bleeding damage to blood vessels nerves or tendons blood clots pulmonary embolism persistent or worsening pain stiffness rerupture and failure to return to presurgery and precondition levels of activity as well as wound healing complications could result in long-term wound care or plastics reconstruction.    He understands he will be nonweightbearing for at least 4 weeks and will be progressively weightbearing thereafter with a boot and will require extensive physical therapy and no guarantees could be given that he would return to his preinjury level of activity and that it could take him up to a year to 18 months to see how well he really did from this.    All of his questions were answered to his full satisfaction he was fitted with double heel lifts in his boot may do partial weightbearing only with his crutches continue with ice and elevation we will plan to proceed with operative repair on an outpatient basis later this week.  He was encouraged to call if he has any questions prior to surgery

## 2020-01-14 ENCOUNTER — TELEPHONE (OUTPATIENT)
Dept: ORTHOPEDIC SURGERY | Facility: CLINIC | Age: 30
End: 2020-01-14

## 2020-01-15 ENCOUNTER — APPOINTMENT (OUTPATIENT)
Dept: PREADMISSION TESTING | Facility: HOSPITAL | Age: 30
End: 2020-01-15

## 2020-01-15 VITALS
BODY MASS INDEX: 29.42 KG/M2 | HEART RATE: 70 BPM | SYSTOLIC BLOOD PRESSURE: 128 MMHG | DIASTOLIC BLOOD PRESSURE: 80 MMHG | HEIGHT: 68 IN | RESPIRATION RATE: 16 BRPM | OXYGEN SATURATION: 98 % | WEIGHT: 194.1 LBS | TEMPERATURE: 98.2 F

## 2020-01-15 LAB
DEPRECATED RDW RBC AUTO: 40.3 FL (ref 37–54)
ERYTHROCYTE [DISTWIDTH] IN BLOOD BY AUTOMATED COUNT: 12.9 % (ref 12.3–15.4)
HCT VFR BLD AUTO: 44.8 % (ref 37.5–51)
HGB BLD-MCNC: 14.9 G/DL (ref 13–17.7)
MCH RBC QN AUTO: 28.9 PG (ref 26.6–33)
MCHC RBC AUTO-ENTMCNC: 33.3 G/DL (ref 31.5–35.7)
MCV RBC AUTO: 87 FL (ref 79–97)
PLATELET # BLD AUTO: 201 10*3/MM3 (ref 140–450)
PMV BLD AUTO: 10.6 FL (ref 6–12)
RBC # BLD AUTO: 5.15 10*6/MM3 (ref 4.14–5.8)
WBC NRBC COR # BLD: 5.07 10*3/MM3 (ref 3.4–10.8)

## 2020-01-15 PROCEDURE — 85027 COMPLETE CBC AUTOMATED: CPT | Performed by: ORTHOPAEDIC SURGERY

## 2020-01-15 PROCEDURE — 36415 COLL VENOUS BLD VENIPUNCTURE: CPT

## 2020-01-15 NOTE — DISCHARGE INSTRUCTIONS
Take the following medications the morning of surgery:  NONE    PLEASE ARRIVE AT THE HOSPITAL AT 5:30 AM ON January 17, 2020    General Instructions:  • Do not eat solid food after midnight the night before surgery.  • You may drink clear liquids day of surgery but must stop at least one hour before your hospital arrival time.  • NOTHING TO DRINK AFTER 4:30 AM  • It is beneficial for you to have a clear drink that contains carbohydrates the day of surgery.  We suggest a 12 to 20 ounce bottle of Gatorade or Powerade for non-diabetic patients or a 12 to 20 ounce bottle of G2 or Powerade Zero for diabetic patients. (Pediatric patients, are not advised to drink a 12 to 20 ounce carbohydrate drink)    Clear liquids are liquids you can see through.  Nothing red in color.     Plain water                               Sports drinks  Sodas                                   Gelatin (Jell-O)  Fruit juices without pulp such as white grape juice and apple juice  Popsicles that contain no fruit or yogurt  Tea or coffee (no cream or milk added)  Gatorade / Powerade  G2 / Powerade Zero    • Infants may have breast milk up to four hours before surgery.  • Infants drinking formula may drink formula up to six hours before surgery.   • Patients who avoid smoking, chewing tobacco and alcohol for 4 weeks prior to surgery have a reduced risk of post-operative complications.  Quit smoking as many days before surgery as you can.  • Do not smoke, use chewing tobacco or drink alcohol the day of surgery.   • If applicable bring your C-PAP/ BI-PAP machine.  • Bring any papers given to you in the doctor’s office.  • Wear clean comfortable clothes.  • Do not wear contact lenses, false eyelashes or make-up.  Bring a case for your glasses.   • Bring crutches or walker if applicable.  • Remove all piercings.  Leave jewelry and any other valuables at home.  • Hair extensions with metal clips must be removed prior to surgery.  • The Pre-Admission  Testing nurse will instruct you to bring medications if unable to obtain an accurate list in Pre-Admission Testing.      Preventing a Surgical Site Infection:  • For 2 to 3 days before surgery, avoid shaving with a razor because the razor can irritate skin and make it easier to develop an infection.    • Any areas of open skin can increase the risk of a post-operative wound infection by allowing bacteria to enter and travel throughout the body.  Notify your surgeon if you have any skin wounds / rashes even if it is not near the expected surgical site.  The area will need assessed to determine if surgery should be delayed until it is healed.  • The night prior to surgery sleep in a clean bed with clean clothing.  Do not allow pets to sleep with you.  • Shower on the morning of surgery using a fresh bar of anti-bacterial soap (such as Dial) and clean washcloth.  Dry with a clean towel and dress in clean clothing.  • Ask your surgeon if you will be receiving antibiotics prior to surgery.  • Make sure you, your family, and all healthcare providers clean their hands with soap and water or an alcohol based hand  before caring for you or your wound.    Day of surgery:  Your arrival time is approximately two hours before your scheduled surgery time.  Upon arrival, a Pre-op nurse and Anesthesiologist will review your health history, obtain vital signs, and answer questions you may have.  The only belongings needed at this time will be a list of your home medications and if applicable your C-PAP/BI-PAP machine.  If you are staying overnight your family can leave the rest of your belongings in the car and bring them to your room later.  A Pre-op nurse will start an IV and you may receive medication in preparation for surgery, including something to help you relax.  Your family will be able to see you in the Pre-op area.  Two visitors at a time will be allowed in the Pre-op room.  While you are in surgery your family  should notify the waiting room  if they leave the waiting room area and provide a contact phone number.    Please be aware that surgery does come with discomfort.  We want to make every effort to control your discomfort so please discuss any uncontrolled symptoms with your nurse.   Your doctor will most likely have prescribed pain medications.      If you are going home after surgery you will receive individualized written care instructions before being discharged.  A responsible adult must drive you to and from the hospital on the day of your surgery and stay with you for 24 hours.    If you are staying overnight following surgery, you will be transported to your hospital room following the recovery period.  [facility] has all private rooms.    If you have any questions please call Pre-Admission Testing at [phone].  Deductibles and co-payments are collected on the day of service. Please be prepared to pay the required co-pay, deductible or deposit on the day of service as defined by your plan.

## 2020-01-16 NOTE — H&P
"   Patient: Elva Jasmine  YOB: 1990 29 y.o. male  Medical Record Number: [id number]     Chief Complaints: I hurt my Achilles     History of Present Illness: Patient injured his left Achilles on 1/11/2020 when playing basketball he said he went to \"post up\" and felt like somebody kicked him in the back of his left Achilles.  He was seen in the emergency room and had persistent complaints of mild intermittent aching pain with bruising and swelling worse with walking.  He got a boot and crutches but comes in today without using the crutches.     He had no prodromal symptoms of pain in the left Achilles.         HPI     Allergies: No Known Allergies     Medications:        Current Outpatient Medications on File Prior to Visit   Medication Sig   • B Complex-C-Folic Acid (STRESS FORMULA) tablet Take 1 tablet by mouth Daily.   • cholecalciferol (VITAMIN D3) 1000 units tablet Take 1,000 Units by mouth Daily.   • Multiple Vitamins-Minerals (MULTIVITAMIN WITH MINERALS) tablet tablet Take 1 tablet by mouth Daily.   • naproxen (NAPROSYN) 500 MG tablet Take 1 tablet by mouth 2 (Two) Times a Day With Meals.   • Omega-3 Fatty Acids (FISH OIL) 1000 MG capsule capsule Take  by mouth Daily With Breakfast.      No current facility-administered medications on file prior to visit.          Medical History        Past Medical History:   Diagnosis Date   • Atypical chest pain     • Heart palpitations     • Hypertension           Surgical History   No past surgical history on file.     Social History            Occupational History   • Not on file   Tobacco Use   • Smoking status: Never Smoker   • Smokeless tobacco: Never Used   Substance and Sexual Activity   • Alcohol use: Yes       Frequency: 2-4 times a month       Comment: social   • Drug use: No   • Sexual activity: Yes       Partners: Female      Social History          Social History Narrative   • Not on file            Family History   Problem Relation Age of Onset " "  • Diabetes Paternal Uncle     • Hypertension Paternal Uncle     • No Known Problems Mother     • No Known Problems Father     • No Known Problems Brother     • Diabetes Maternal Grandmother     • Stroke Maternal Grandfather     • Heart attack Maternal Grandfather     • Hypertension Maternal Grandfather     • Alcohol abuse Maternal Grandfather           Review of Systems: 14 point review of systems performed, positive pertinent findings identified in HPI. All remaining systems negative except fatigue, chest pain and palpitations for which she is seen cardiology, nausea, frequent urination, dizziness and lightheadedness     Review of Systems        Physical Exam:   Vitals       Vitals:     01/13/20 1605   Temp: 98.6 °F (37 °C)   Weight: 84.8 kg (187 lb)   Height: 172.7 cm (68\")         Physical Exam   Constitutional: pleasant, well developed   Eyes: sclera non icteric  Hearing : adequate for exam  Cardiovascular: palpable pulses in left foot, left calf/ thigh NT without sign of DVT  Respiratoy: breathing unlabored   Neurological: grossly sensate to LT throughout left LE  Psychiatric: oriented with normal mood and affect.   Lymphatic: No palpable popliteal lymphadenopathy left LE  Skin: intact throughout left leg/foot  Musculoskeletal: Examination left hindfoot shows mild swelling around the Achilles.  There is palpable defect of the mid substance of the Achilles with mild tenderness to palpation.  There is increased resting length on the left compared to the right and a positive Tam test on the left.  Nontender the left medial or lateral malleolus nor over the calcaneal insertion of the Achilles.  Physical Exam  Ortho Exam     Radiology: 3 views left ankle ordered evaluate pain reviewed and no prior x-rays available for comparison do not see obvious fracture about the ankle no obvious malalignment and no intratendinous calcifications along the Achilles.     Assessment/Plan: 1.  Left Achilles tendon " rupture     We reviewed operative and nonoperative treatment options in detail and although no guarantees could be given decision was made to proceed with operative treatment he voiced clear understanding the operative procedure and associated risks benefits potential outcomes and complications which can include but not limited to heart attack stroke death pneumonia infection bleeding damage to blood vessels nerves or tendons blood clots pulmonary embolism persistent or worsening pain stiffness rerupture and failure to return to presurgery and precondition levels of activity as well as wound healing complications could result in long-term wound care or plastics reconstruction.     He understands he will be nonweightbearing for at least 4 weeks and will be progressively weightbearing thereafter with a boot and will require extensive physical therapy and no guarantees could be given that he would return to his preinjury level of activity and that it could take him up to a year to 18 months to see how well he really did from this.     All of his questions were answered to his full satisfaction he was fitted with double heel lifts in his boot may do partial weightbearing only with his crutches continue with ice and elevation we will plan to proceed with operative repair on an outpatient basis later this week.  He was encouraged to call if he has any questions prior to surgery

## 2020-01-17 ENCOUNTER — ANESTHESIA (OUTPATIENT)
Dept: PERIOP | Facility: HOSPITAL | Age: 30
End: 2020-01-17

## 2020-01-17 ENCOUNTER — HOSPITAL ENCOUNTER (OUTPATIENT)
Facility: HOSPITAL | Age: 30
Setting detail: HOSPITAL OUTPATIENT SURGERY
Discharge: HOME | End: 2020-01-17
Attending: ORTHOPAEDIC SURGERY | Admitting: ORTHOPAEDIC SURGERY

## 2020-01-17 ENCOUNTER — ANESTHESIA EVENT (OUTPATIENT)
Dept: PERIOP | Facility: HOSPITAL | Age: 30
End: 2020-01-17

## 2020-01-17 VITALS
DIASTOLIC BLOOD PRESSURE: 80 MMHG | OXYGEN SATURATION: 99 % | RESPIRATION RATE: 16 BRPM | SYSTOLIC BLOOD PRESSURE: 121 MMHG | TEMPERATURE: 97.7 F | HEART RATE: 70 BPM

## 2020-01-17 DIAGNOSIS — S86.012A ACHILLES TENDON TEAR, LEFT, INITIAL ENCOUNTER: Primary | ICD-10-CM

## 2020-01-17 PROCEDURE — 25010000002 ONDANSETRON PER 1 MG: Performed by: ANESTHESIOLOGY

## 2020-01-17 PROCEDURE — 25010000002 PROMETHAZINE PER 50 MG: Performed by: ANESTHESIOLOGY

## 2020-01-17 PROCEDURE — 25010000002 FENTANYL CITRATE (PF) 100 MCG/2ML SOLUTION: Performed by: ANESTHESIOLOGY

## 2020-01-17 PROCEDURE — 25010000002 ONDANSETRON PER 1 MG: Performed by: NURSE ANESTHETIST, CERTIFIED REGISTERED

## 2020-01-17 PROCEDURE — 25010000002 NEOSTIGMINE PER 0.5 MG: Performed by: NURSE ANESTHETIST, CERTIFIED REGISTERED

## 2020-01-17 PROCEDURE — 25010000002 DEXAMETHASONE PER 1 MG: Performed by: ANESTHESIOLOGY

## 2020-01-17 PROCEDURE — 25010000002 ROPIVACAINE PER 1 MG: Performed by: ANESTHESIOLOGY

## 2020-01-17 PROCEDURE — 25010000002 PROMETHAZINE PER 50 MG: Performed by: NURSE ANESTHETIST, CERTIFIED REGISTERED

## 2020-01-17 PROCEDURE — 25010000002 PROPOFOL 10 MG/ML EMULSION: Performed by: NURSE ANESTHETIST, CERTIFIED REGISTERED

## 2020-01-17 PROCEDURE — 25010000003 CEFAZOLIN IN DEXTROSE 2-4 GM/100ML-% SOLUTION: Performed by: ORTHOPAEDIC SURGERY

## 2020-01-17 PROCEDURE — 25010000002 MIDAZOLAM PER 1 MG: Performed by: ANESTHESIOLOGY

## 2020-01-17 PROCEDURE — 27650 REPAIR ACHILLES TENDON: CPT | Performed by: ORTHOPAEDIC SURGERY

## 2020-01-17 DEVICE — SUT FW #2 W/TPR NDL 1/2 CIR 38IN 97CM 26.5MM BLU: Type: IMPLANTABLE DEVICE | Site: ACHILLES TENDON | Status: FUNCTIONAL

## 2020-01-17 RX ORDER — ONDANSETRON 2 MG/ML
INJECTION INTRAMUSCULAR; INTRAVENOUS AS NEEDED
Status: DISCONTINUED | OUTPATIENT
Start: 2020-01-17 | End: 2020-01-17 | Stop reason: SURG

## 2020-01-17 RX ORDER — SODIUM CHLORIDE 0.9 % (FLUSH) 0.9 %
3 SYRINGE (ML) INJECTION EVERY 12 HOURS SCHEDULED
Status: DISCONTINUED | OUTPATIENT
Start: 2020-01-17 | End: 2020-01-17 | Stop reason: HOSPADM

## 2020-01-17 RX ORDER — SODIUM CHLORIDE 0.9 % (FLUSH) 0.9 %
3-10 SYRINGE (ML) INJECTION AS NEEDED
Status: DISCONTINUED | OUTPATIENT
Start: 2020-01-17 | End: 2020-01-17 | Stop reason: HOSPADM

## 2020-01-17 RX ORDER — EPHEDRINE SULFATE 50 MG/ML
5 INJECTION, SOLUTION INTRAVENOUS ONCE AS NEEDED
Status: DISCONTINUED | OUTPATIENT
Start: 2020-01-17 | End: 2020-01-17 | Stop reason: HOSPADM

## 2020-01-17 RX ORDER — OXYCODONE AND ACETAMINOPHEN 7.5; 325 MG/1; MG/1
1 TABLET ORAL EVERY 4 HOURS PRN
Status: DISCONTINUED | OUTPATIENT
Start: 2020-01-17 | End: 2020-01-17 | Stop reason: HOSPADM

## 2020-01-17 RX ORDER — MEPERIDINE HYDROCHLORIDE 25 MG/ML
12.5 INJECTION INTRAMUSCULAR; INTRAVENOUS; SUBCUTANEOUS
Status: DISCONTINUED | OUTPATIENT
Start: 2020-01-17 | End: 2020-01-17 | Stop reason: HOSPADM

## 2020-01-17 RX ORDER — PROMETHAZINE HYDROCHLORIDE 25 MG/ML
6.25 INJECTION, SOLUTION INTRAMUSCULAR; INTRAVENOUS ONCE AS NEEDED
Status: COMPLETED | OUTPATIENT
Start: 2020-01-17 | End: 2020-01-17

## 2020-01-17 RX ORDER — MIDAZOLAM HYDROCHLORIDE 1 MG/ML
2 INJECTION INTRAMUSCULAR; INTRAVENOUS
Status: DISCONTINUED | OUTPATIENT
Start: 2020-01-17 | End: 2020-01-17 | Stop reason: HOSPADM

## 2020-01-17 RX ORDER — PROPOFOL 10 MG/ML
VIAL (ML) INTRAVENOUS AS NEEDED
Status: DISCONTINUED | OUTPATIENT
Start: 2020-01-17 | End: 2020-01-17 | Stop reason: SURG

## 2020-01-17 RX ORDER — PROMETHAZINE HYDROCHLORIDE 25 MG/1
25 SUPPOSITORY RECTAL ONCE AS NEEDED
Status: COMPLETED | OUTPATIENT
Start: 2020-01-17 | End: 2020-01-17

## 2020-01-17 RX ORDER — MIDAZOLAM HYDROCHLORIDE 1 MG/ML
1 INJECTION INTRAMUSCULAR; INTRAVENOUS
Status: DISCONTINUED | OUTPATIENT
Start: 2020-01-17 | End: 2020-01-17 | Stop reason: HOSPADM

## 2020-01-17 RX ORDER — GLYCOPYRROLATE 0.2 MG/ML
INJECTION INTRAMUSCULAR; INTRAVENOUS AS NEEDED
Status: DISCONTINUED | OUTPATIENT
Start: 2020-01-17 | End: 2020-01-17 | Stop reason: SURG

## 2020-01-17 RX ORDER — FENTANYL CITRATE 50 UG/ML
100 INJECTION, SOLUTION INTRAMUSCULAR; INTRAVENOUS
Status: DISCONTINUED | OUTPATIENT
Start: 2020-01-17 | End: 2020-01-17 | Stop reason: HOSPADM

## 2020-01-17 RX ORDER — OXYCODONE HYDROCHLORIDE AND ACETAMINOPHEN 5; 325 MG/1; MG/1
1-2 TABLET ORAL EVERY 4 HOURS PRN
Qty: 60 TABLET | Refills: 0 | Status: SHIPPED | OUTPATIENT
Start: 2020-01-17 | End: 2020-01-27

## 2020-01-17 RX ORDER — MEPERIDINE HYDROCHLORIDE 25 MG/ML
INJECTION INTRAMUSCULAR; INTRAVENOUS; SUBCUTANEOUS
Status: COMPLETED
Start: 2020-01-17 | End: 2020-01-17

## 2020-01-17 RX ORDER — LIDOCAINE HYDROCHLORIDE 20 MG/ML
INJECTION, SOLUTION INFILTRATION; PERINEURAL AS NEEDED
Status: DISCONTINUED | OUTPATIENT
Start: 2020-01-17 | End: 2020-01-17 | Stop reason: SURG

## 2020-01-17 RX ORDER — OXYCODONE HYDROCHLORIDE AND ACETAMINOPHEN 5; 325 MG/1; MG/1
1 TABLET ORAL ONCE AS NEEDED
Status: CANCELLED | OUTPATIENT
Start: 2020-01-17 | End: 2020-01-27

## 2020-01-17 RX ORDER — ONDANSETRON 2 MG/ML
4 INJECTION INTRAMUSCULAR; INTRAVENOUS ONCE AS NEEDED
Status: COMPLETED | OUTPATIENT
Start: 2020-01-17 | End: 2020-01-17

## 2020-01-17 RX ORDER — CEFAZOLIN SODIUM 2 G/100ML
2 INJECTION, SOLUTION INTRAVENOUS ONCE
Status: COMPLETED | OUTPATIENT
Start: 2020-01-17 | End: 2020-01-17

## 2020-01-17 RX ORDER — FENTANYL CITRATE 50 UG/ML
50 INJECTION, SOLUTION INTRAMUSCULAR; INTRAVENOUS
Status: DISCONTINUED | OUTPATIENT
Start: 2020-01-17 | End: 2020-01-17 | Stop reason: HOSPADM

## 2020-01-17 RX ORDER — ROCURONIUM BROMIDE 10 MG/ML
INJECTION, SOLUTION INTRAVENOUS AS NEEDED
Status: DISCONTINUED | OUTPATIENT
Start: 2020-01-17 | End: 2020-01-17 | Stop reason: SURG

## 2020-01-17 RX ORDER — FAMOTIDINE 10 MG/ML
20 INJECTION, SOLUTION INTRAVENOUS ONCE
Status: COMPLETED | OUTPATIENT
Start: 2020-01-17 | End: 2020-01-17

## 2020-01-17 RX ORDER — ROPIVACAINE HYDROCHLORIDE 5 MG/ML
INJECTION, SOLUTION EPIDURAL; INFILTRATION; PERINEURAL
Status: COMPLETED | OUTPATIENT
Start: 2020-01-17 | End: 2020-01-17

## 2020-01-17 RX ORDER — SODIUM CHLORIDE, SODIUM LACTATE, POTASSIUM CHLORIDE, CALCIUM CHLORIDE 600; 310; 30; 20 MG/100ML; MG/100ML; MG/100ML; MG/100ML
9 INJECTION, SOLUTION INTRAVENOUS CONTINUOUS
Status: DISCONTINUED | OUTPATIENT
Start: 2020-01-17 | End: 2020-01-17 | Stop reason: HOSPADM

## 2020-01-17 RX ORDER — HYDROMORPHONE HYDROCHLORIDE 1 MG/ML
0.5 INJECTION, SOLUTION INTRAMUSCULAR; INTRAVENOUS; SUBCUTANEOUS
Status: DISCONTINUED | OUTPATIENT
Start: 2020-01-17 | End: 2020-01-17 | Stop reason: HOSPADM

## 2020-01-17 RX ORDER — PROMETHAZINE HYDROCHLORIDE 25 MG/ML
INJECTION, SOLUTION INTRAMUSCULAR; INTRAVENOUS AS NEEDED
Status: DISCONTINUED | OUTPATIENT
Start: 2020-01-17 | End: 2020-01-17 | Stop reason: SURG

## 2020-01-17 RX ORDER — ACETAMINOPHEN 650 MG
TABLET, EXTENDED RELEASE ORAL AS NEEDED
Status: DISCONTINUED | OUTPATIENT
Start: 2020-01-17 | End: 2020-01-17 | Stop reason: HOSPADM

## 2020-01-17 RX ORDER — BUPIVACAINE HYDROCHLORIDE 2.5 MG/ML
INJECTION, SOLUTION EPIDURAL; INFILTRATION; INTRACAUDAL
Status: COMPLETED | OUTPATIENT
Start: 2020-01-17 | End: 2020-01-17

## 2020-01-17 RX ORDER — FLUMAZENIL 0.1 MG/ML
0.2 INJECTION INTRAVENOUS AS NEEDED
Status: DISCONTINUED | OUTPATIENT
Start: 2020-01-17 | End: 2020-01-17 | Stop reason: HOSPADM

## 2020-01-17 RX ORDER — CEPHALEXIN 500 MG/1
500 CAPSULE ORAL EVERY 6 HOURS
Qty: 8 CAPSULE | Refills: 0 | Status: SHIPPED | OUTPATIENT
Start: 2020-01-17 | End: 2020-01-19

## 2020-01-17 RX ORDER — HYDROCODONE BITARTRATE AND ACETAMINOPHEN 7.5; 325 MG/1; MG/1
1 TABLET ORAL ONCE AS NEEDED
Status: DISCONTINUED | OUTPATIENT
Start: 2020-01-17 | End: 2020-01-17 | Stop reason: HOSPADM

## 2020-01-17 RX ORDER — LIDOCAINE HYDROCHLORIDE 10 MG/ML
0.5 INJECTION, SOLUTION EPIDURAL; INFILTRATION; INTRACAUDAL; PERINEURAL ONCE AS NEEDED
Status: DISCONTINUED | OUTPATIENT
Start: 2020-01-17 | End: 2020-01-17 | Stop reason: HOSPADM

## 2020-01-17 RX ORDER — DEXAMETHASONE SODIUM PHOSPHATE 4 MG/ML
INJECTION, SOLUTION INTRA-ARTICULAR; INTRALESIONAL; INTRAMUSCULAR; INTRAVENOUS; SOFT TISSUE
Status: COMPLETED | OUTPATIENT
Start: 2020-01-17 | End: 2020-01-17

## 2020-01-17 RX ORDER — PROMETHAZINE HYDROCHLORIDE 25 MG/1
25 TABLET ORAL ONCE AS NEEDED
Status: COMPLETED | OUTPATIENT
Start: 2020-01-17 | End: 2020-01-17

## 2020-01-17 RX ORDER — HYDRALAZINE HYDROCHLORIDE 20 MG/ML
5 INJECTION INTRAMUSCULAR; INTRAVENOUS
Status: DISCONTINUED | OUTPATIENT
Start: 2020-01-17 | End: 2020-01-17 | Stop reason: HOSPADM

## 2020-01-17 RX ADMIN — ROCURONIUM BROMIDE 50 MG: 10 INJECTION, SOLUTION INTRAVENOUS at 07:27

## 2020-01-17 RX ADMIN — MEPERIDINE HYDROCHLORIDE 12.5 MG: 25 INJECTION INTRAMUSCULAR; INTRAVENOUS; SUBCUTANEOUS at 09:35

## 2020-01-17 RX ADMIN — MEPERIDINE HYDROCHLORIDE 12.5 MG: 25 INJECTION INTRAMUSCULAR; INTRAVENOUS; SUBCUTANEOUS at 09:25

## 2020-01-17 RX ADMIN — SODIUM CHLORIDE, POTASSIUM CHLORIDE, SODIUM LACTATE AND CALCIUM CHLORIDE: 600; 310; 30; 20 INJECTION, SOLUTION INTRAVENOUS at 08:46

## 2020-01-17 RX ADMIN — DEXAMETHASONE SODIUM PHOSPHATE 4 MG: 4 INJECTION INTRA-ARTICULAR; INTRALESIONAL; INTRAMUSCULAR; INTRAVENOUS; SOFT TISSUE at 06:42

## 2020-01-17 RX ADMIN — ONDANSETRON 4 MG: 2 INJECTION INTRAMUSCULAR; INTRAVENOUS at 09:25

## 2020-01-17 RX ADMIN — FAMOTIDINE 20 MG: 10 INJECTION INTRAVENOUS at 06:30

## 2020-01-17 RX ADMIN — PROPOFOL 200 MG: 10 INJECTION, EMULSION INTRAVENOUS at 07:26

## 2020-01-17 RX ADMIN — SODIUM CHLORIDE, POTASSIUM CHLORIDE, SODIUM LACTATE AND CALCIUM CHLORIDE 9 ML/HR: 600; 310; 30; 20 INJECTION, SOLUTION INTRAVENOUS at 06:29

## 2020-01-17 RX ADMIN — LIDOCAINE HYDROCHLORIDE 40 MG: 20 INJECTION, SOLUTION INFILTRATION; PERINEURAL at 07:27

## 2020-01-17 RX ADMIN — FENTANYL CITRATE 100 MCG: 50 INJECTION, SOLUTION INTRAMUSCULAR; INTRAVENOUS at 06:31

## 2020-01-17 RX ADMIN — PROMETHAZINE HYDROCHLORIDE 6.25 MG: 25 INJECTION INTRAMUSCULAR; INTRAVENOUS at 07:22

## 2020-01-17 RX ADMIN — BUPIVACAINE HYDROCHLORIDE 10 ML: 2.5 INJECTION, SOLUTION EPIDURAL; INFILTRATION; INTRACAUDAL; PERINEURAL at 06:42

## 2020-01-17 RX ADMIN — CEFAZOLIN SODIUM 2 G: 2 INJECTION, SOLUTION INTRAVENOUS at 07:26

## 2020-01-17 RX ADMIN — MIDAZOLAM 2 MG: 1 INJECTION INTRAMUSCULAR; INTRAVENOUS at 06:29

## 2020-01-17 RX ADMIN — PROMETHAZINE HYDROCHLORIDE 6.25 MG: 25 INJECTION INTRAMUSCULAR; INTRAVENOUS at 09:40

## 2020-01-17 RX ADMIN — GLYCOPYRROLATE 0.4 MG: 0.2 INJECTION INTRAMUSCULAR; INTRAVENOUS at 08:46

## 2020-01-17 RX ADMIN — ROPIVACAINE HYDROCHLORIDE 20 ML: 5 INJECTION, SOLUTION EPIDURAL; INFILTRATION; PERINEURAL at 06:42

## 2020-01-17 RX ADMIN — ONDANSETRON HYDROCHLORIDE 4 MG: 2 SOLUTION INTRAMUSCULAR; INTRAVENOUS at 07:22

## 2020-01-17 RX ADMIN — Medication 3 MG: at 08:46

## 2020-01-17 NOTE — ANESTHESIA PREPROCEDURE EVALUATION
Anesthesia Evaluation     Patient summary reviewed and Nursing notes reviewed   NPO Solid Status: > 8 hours             Airway   Mallampati: II  TM distance: >3 FB  Neck ROM: full  no difficulty expected  Dental - normal exam     Pulmonary - negative pulmonary ROS and normal exam   Cardiovascular - normal exam    (+) hypertension, dysrhythmias Tachycardia,       Neuro/Psych  (+) psychiatric history Anxiety,     GI/Hepatic/Renal/Endo - negative ROS     Musculoskeletal (-) negative ROS    Abdominal  - normal exam   Substance History - negative use     OB/GYN negative ob/gyn ROS         Other                        Anesthesia Plan    ASA 2     general   (Pop popc)  intravenous induction     Anesthetic plan, all risks, benefits, and alternatives have been provided, discussed and informed consent has been obtained with: patient.    Plan discussed with CRNA.

## 2020-01-17 NOTE — OP NOTE
Operative Note      Facility: [facility]  Patient Name: Elva Jasmine  YOB: 1990  Date: 1/17/2020  Medical Record Number: [id number]      Pre-op Diagnosis: Left Achilles tendon rupture      Post-op Diagnosis:  Left Achilles tendon rupture        Procedure(s):  Left ACHILLES TENDON REPAIR    Surgeon(s):  Paco Sarah MD    Anesthesia: General  Anesthesiologist: Daryn Scott MD  CRNA: Angela Penn CRNA    Staff:   Cell Saver : Agustin Messina  Circulator: Allyson Souza RN  Scrub Person: Katelyn Ashraf  Vendor Representative: Paco Arango  Assistants : none      Tourniquet time: 28 minutes        Estimated Blood Loss:  minimal  Drains: None  Specimens: * No orders in the log *  Findings: See Dictation    Complications: None      Indications for Procedure: Patient injured his left Achilles on 1/11/2021 playing basketball.  He was seen in the office earlier this week with a decision made to proceed with operative treatment.  He voiced clear understanding the operative procedure with associated risk benefits potential outcomes and complications          Description of Procedure:. preoperative informed consent and anesthesia evaluation were obtained.  IV antibiotics were administered and the surgical site was marked.  Block was administered by anesthesia.  Patient was brought to the operating room placed in supine position.  Anesthesia was induced and patient was intubated.  Well-padded tourniquet is placed left proximal thigh.    Patient was then carefully placed in a prone position and all bony and soft tissue prominences were well-padded.  Care was taken to note the preoperative resting length of the contralateral limb.  Left leg was prepped and draped in a sterile fashion and surgical timeout was performed.  Left leg was exsanguinated and pneumatic tourniquet inflated to 250 mmHg.    I was able to palpate the area of the defect in the central  portion of the Achilles.  Midline incision was made centered over this and extended both proximally and distally with a minimal touch technique down in a full-thickness fashion through the peritenon.  This was then carefully elevated over the ruptured limbs of the Achilles which were gently mobilized with care taken not over strep anteriorly.  The ends were then freshened.  I then placed a running [place] suture of #2 FiberWire through the proximal and distal stumps creating 2 central core sutures.  The ankle was then plantarflexed and supported with a bump to match the contralateral resting length.  The repair bed was then sprayed with PRP the core sutures were then secured with nice reapproximation of the tendon ends with restoration of isometric resting length compared to the contralateral limb.  The repair was then oversewn circumferentially with 0 Vicryl locking sutures creating a nice robust repair.  The wound was again irrigated copiously with dilute Betadine solution as had been done throughout the case.  The tourniquet was then released and hemostasis was obtained.  The construct was again sprayed with PRP and the peritenon was repaired with 2-0 and 3-0 Vicryl without incarceration of the tendon.  The wound was again irrigated and sprayed with PRP and closed with 4-0 Monocryl and staples without wound tension.  The wound bed was then sprayed with PPP which was allowed to gel and Xeroform was placed with a sterile bulky dressing and a well-padded short leg posterior splint and about 15 degrees of plantarflexion.  [facility] and taken recovery in stable condition

## 2020-01-17 NOTE — ANESTHESIA PROCEDURE NOTES
Peripheral Block    Pre-sedation assessment completed: 1/17/2020 6:28 AM    Patient reassessed immediately prior to procedure    Patient location during procedure: pre-op  Start time: 1/17/2020 6:28 AM  Stop time: 1/17/2020 6:42 AM  Reason for block: at surgeon's request and post-op pain management  Performed by  Anesthesiologist: Matt Mata MD  Preanesthetic Checklist  Completed: patient identified, site marked, surgical consent, pre-op evaluation, timeout performed, IV checked, risks and benefits discussed and monitors and equipment checked  Prep:  Pt Position: supine  Sterile barriers:cap, gloves, mask and sterile barriers  Prep: ChloraPrep  Patient monitoring: blood pressure monitoring, continuous pulse oximetry and EKG  Procedure  Sedation:yes  Performed under: local infiltration  Guidance:ultrasound guided  ULTRASOUND INTERPRETATION.  Using ultrasound guidance a 22 G gauge needle was placed in close proximity to the sciatic nerve, at which point, under ultrasound guidance anesthetic was injected in the area of the nerve and spread of the anesthesia was seen on ultrasound in close proximity thereto.  There were no abnormalities seen on ultrasound; a digital image was taken; and the patient tolerated the procedure with no complications. Images:still images obtained    Laterality:left  Block Type:popliteal and sciatic  Injection Technique:single-shot  Needle Type:echogenic  Needle Gauge:22 G  Resistance on Injection: less than 15 psi    Medications Used: dexamethasone (DECADRON) injection, 4 mg  ropivacaine (NAROPIN) 0.5 % injection, 20 mL  bupivacaine PF (MARCAINE) 0.25 % injection, 10 mL  Med admintered at 1/17/2020 6:42 AM      Post Assessment  Injection Assessment: negative aspiration for heme, no paresthesia on injection and incremental injection  Patient Tolerance:comfortable throughout block  Complications:no

## 2020-01-17 NOTE — BRIEF OP NOTE
ACHILLES TENDON REPAIR  Progress Note    Elva Jasmine  1/17/2020    Pre-op Diagnosis:   Achilles tendon tear, left, initial encounter [S86.012A]       Post-Op Diagnosis Codes:     * Achilles tendon tear, left, initial encounter [S86.012A]    Procedure/CPT® Codes:      Procedure(s):  Left ACHILLES TENDON REPAIR    Surgeon(s):  Paco Sarah MD    Anesthesia: General    Staff:   Cell Saver : Agustin Messina  Circulator: Allyson Souza RN  Scrub Person: Katelyn Ashraf  Vendor Representative: Paco Arango    Estimated Blood Loss: minimal    Urine Voided: * No values recorded between 1/17/2020  7:17 AM and 1/17/2020  8:55 AM *    Specimens:                None    TT 28 min      Drains: * No LDAs found *    Findings: see dict    Complications: none      Paco Sarah MD     Date: 1/17/2020  Time: 8:57 AM

## 2020-01-17 NOTE — ANESTHESIA POSTPROCEDURE EVALUATION
Patient: Elva Jasmine    Procedure Summary     Date:  01/17/20 Room / Location:   [facility]  [facility]    Anesthesia Start:  0715 Anesthesia Stop:  0905    Procedure:  Left ACHILLES TENDON REPAIR (Left Ankle) Diagnosis:       Achilles tendon tear, left, initial encounter      (Achilles tendon tear, left, initial encounter [S86.012A])    Surgeon:  Paco Sarah MD Provider:  Daryn Scott MD    Anesthesia Type:  general ASA Status:  2          Anesthesia Type: general    Vitals  Vitals Value Taken Time   /78 1/17/2020 11:15 AM   Temp 36.5 °C (97.7 °F) 1/17/2020 11:15 AM   Pulse 75 1/17/2020 11:15 AM   Resp 16 1/17/2020 11:15 AM   SpO2 100 % 1/17/2020 11:19 AM   Vitals shown include unvalidated device data.        Post Anesthesia Care and Evaluation    Patient location during evaluation: bedside  Patient participation: complete - patient participated  Level of consciousness: awake  Pain score: 1  Pain management: adequate  Airway patency: patent  Anesthetic complications: No anesthetic complications  PONV Status: controlled  Cardiovascular status: acceptable  Respiratory status: acceptable  Hydration status: acceptable    Comments: --------------------            01/17/20               1124     --------------------   BP:       121/80     Pulse:      70       Resp:       16       Temp:                SpO2:      99%      --------------------

## 2020-01-27 ENCOUNTER — OFFICE VISIT (OUTPATIENT)
Dept: ORTHOPEDIC SURGERY | Facility: CLINIC | Age: 30
End: 2020-01-27

## 2020-01-27 VITALS — WEIGHT: 194 LBS | BODY MASS INDEX: 29.4 KG/M2 | TEMPERATURE: 98.3 F | HEIGHT: 68 IN

## 2020-01-27 DIAGNOSIS — S86.012D ACHILLES TENDON TEAR, LEFT, SUBSEQUENT ENCOUNTER: Primary | ICD-10-CM

## 2020-01-27 PROCEDURE — 99024 POSTOP FOLLOW-UP VISIT: CPT | Performed by: ORTHOPAEDIC SURGERY

## 2020-01-27 PROCEDURE — 29405 APPL SHORT LEG CAST: CPT | Performed by: ORTHOPAEDIC SURGERY

## 2020-01-27 NOTE — PROGRESS NOTES
"Ankle Follow Up      Patient: Elva Jasmine    YOB: 1990 29 y.o. male    Chief Complaints: Ankle doing okay    History of Present Illness: Patient follows up left Achilles tendon repair from 1/17/2020    He has been nonweightbearing and reports some mild achiness at the ankle but not much \"pain\".  He said he still has been low bit of numb and tingling to the lateral aspect of his ankle and foot and toes since surgery which has improved some today.      HPI    ROS: ankle pain, numbness, no fevers chills chest pain or shortness of breath  Past Medical History:   Diagnosis Date   • Achilles rupture, left    • Anxiety    • Atypical chest pain    • Heart palpitations    • Hypertension        Physical Exam:   Vitals:    01/27/20 1020   Temp: 98.3 °F (36.8 °C)   Weight: 88 kg (194 lb)   Height: 172.7 cm (68\")     Well developed with normal mood.  Left Achilles incision is healing without sign of infection or breakdown there is only mild swelling.  There was some diminished sensation along the lateral aspect of the left ankle and foot with a slight Tinel's over the sural nerve distribution but grossly intact light touch.  Normal sensation medially.  Left calf was nontender without sign of DVT      Radiology: None performed      Assessment/Plan: Status post left Achilles tendon repair.    Reviewed with him he probably has some sural nerve irritation but sensation is intact light touch toes do not think there is any specific injury to it.  Reviewed with him this should improve over time as swelling improves and nothing specific to do for it at this point.    I will see any sign of infection or wound breakdown at this point nor any DVT.    Staples removed and Steri-Strips were applied.  He was placed in a very well-padded short leg cast in gravity equinus.  We will continue with elevation and nonweightbearing.    I will see him back in 2 weeks for wound check and if he is doing well we will put him into a neutral " dorsiflexion cast and begin some partial weightbearing.

## 2020-02-04 ENCOUNTER — TELEPHONE (OUTPATIENT)
Dept: ORTHOPEDIC SURGERY | Facility: CLINIC | Age: 30
End: 2020-02-04

## 2020-02-10 ENCOUNTER — OFFICE VISIT (OUTPATIENT)
Dept: ORTHOPEDIC SURGERY | Facility: CLINIC | Age: 30
End: 2020-02-10

## 2020-02-10 ENCOUNTER — HOSPITAL ENCOUNTER (OUTPATIENT)
Dept: CARDIOLOGY | Facility: HOSPITAL | Age: 30
Discharge: HOME | End: 2020-02-10
Admitting: ORTHOPAEDIC SURGERY

## 2020-02-10 VITALS — TEMPERATURE: 97.6 F | HEIGHT: 68 IN | WEIGHT: 194 LBS | BODY MASS INDEX: 29.4 KG/M2

## 2020-02-10 DIAGNOSIS — M79.662 PAIN OF LEFT CALF: ICD-10-CM

## 2020-02-10 DIAGNOSIS — S86.012D ACHILLES TENDON TEAR, LEFT, SUBSEQUENT ENCOUNTER: ICD-10-CM

## 2020-02-10 DIAGNOSIS — M79.662 PAIN OF LEFT CALF: Primary | ICD-10-CM

## 2020-02-10 LAB
BH CV LOWER VASCULAR LEFT COMMON FEMORAL AUGMENT: NORMAL
BH CV LOWER VASCULAR LEFT COMMON FEMORAL COMPETENT: NORMAL
BH CV LOWER VASCULAR LEFT COMMON FEMORAL COMPRESS: NORMAL
BH CV LOWER VASCULAR LEFT COMMON FEMORAL PHASIC: NORMAL
BH CV LOWER VASCULAR LEFT COMMON FEMORAL SPONT: NORMAL
BH CV LOWER VASCULAR LEFT DISTAL FEMORAL COMPRESS: NORMAL
BH CV LOWER VASCULAR LEFT GASTRONEMIUS COMPRESS: NORMAL
BH CV LOWER VASCULAR LEFT GREATER SAPH AK COMPRESS: NORMAL
BH CV LOWER VASCULAR LEFT GREATER SAPH BK COMPRESS: NORMAL
BH CV LOWER VASCULAR LEFT MID FEMORAL AUGMENT: NORMAL
BH CV LOWER VASCULAR LEFT MID FEMORAL COMPETENT: NORMAL
BH CV LOWER VASCULAR LEFT MID FEMORAL COMPRESS: NORMAL
BH CV LOWER VASCULAR LEFT MID FEMORAL PHASIC: NORMAL
BH CV LOWER VASCULAR LEFT MID FEMORAL SPONT: NORMAL
BH CV LOWER VASCULAR LEFT PERONEAL COMPRESS: NORMAL
BH CV LOWER VASCULAR LEFT POPLITEAL AUGMENT: NORMAL
BH CV LOWER VASCULAR LEFT POPLITEAL COMPETENT: NORMAL
BH CV LOWER VASCULAR LEFT POPLITEAL COMPRESS: NORMAL
BH CV LOWER VASCULAR LEFT POPLITEAL PHASIC: NORMAL
BH CV LOWER VASCULAR LEFT POPLITEAL SPONT: NORMAL
BH CV LOWER VASCULAR LEFT POSTERIOR TIBIAL COMPRESS: NORMAL
BH CV LOWER VASCULAR LEFT PROFUNDA FEMORAL COMPRESS: NORMAL
BH CV LOWER VASCULAR LEFT PROXIMAL FEMORAL COMPRESS: NORMAL
BH CV LOWER VASCULAR LEFT SAPHENOFEMORAL JUNCTION COMPRESS: NORMAL
BH CV LOWER VASCULAR RIGHT COMMON FEMORAL AUGMENT: NORMAL
BH CV LOWER VASCULAR RIGHT COMMON FEMORAL COMPETENT: NORMAL
BH CV LOWER VASCULAR RIGHT COMMON FEMORAL COMPRESS: NORMAL
BH CV LOWER VASCULAR RIGHT COMMON FEMORAL PHASIC: NORMAL
BH CV LOWER VASCULAR RIGHT COMMON FEMORAL SPONT: NORMAL

## 2020-02-10 PROCEDURE — 29405 APPL SHORT LEG CAST: CPT | Performed by: ORTHOPAEDIC SURGERY

## 2020-02-10 PROCEDURE — 93971 EXTREMITY STUDY: CPT

## 2020-02-10 PROCEDURE — 99024 POSTOP FOLLOW-UP VISIT: CPT | Performed by: ORTHOPAEDIC SURGERY

## 2020-02-10 NOTE — PROGRESS NOTES
2/10/20 Lt. LEV duplex preliminary findings are negative for DVT. Preliminary report given to Dr. Paco Sarah and pt released per f/u instructions.

## 2020-02-11 NOTE — PROGRESS NOTES
"Ankle Follow Up      Patient: Elva Jasmine    YOB: 1990 29 y.o. male    Chief Complaints: Ankle feels pretty good    History of Present Illness: Patient had left Achilles repair on 1/17/2020 and was last seen on 1/27/2020 and placed into a gravity equinus cast.  At that time he had a little bit of numbness and tingling in the lateral aspect of his ankle which has continued to improve but not completely resolved.    He did report several days now of mild aching pain in the left calf.  HPI    ROS: ankle pain no fevers chills chest pain or shortness of breath  Past Medical History:   Diagnosis Date   • Achilles rupture, left    • Anxiety    • Atypical chest pain    • Heart palpitations    • Hypertension        Physical Exam:   Vitals:    02/10/20 0844   Temp: 97.6 °F (36.4 °C)   Weight: 88 kg (194 lb)   Height: 172.7 cm (68\")   PainSc: 0-No pain     Well developed with normal mood.  Left Achilles incision is healing very nicely with some diminished sensation in the distal lateral aspect of the ankle and lateral hindfoot but grossly intact light touch with a negative Tinel's over the sural nerve.  There is some mild discomfort through the midportion of the calf but no focal venous cords.      Radiology: Doppler today (2/10/2020) was negative for DVT      Assessment/Plan: Status post left Achilles tendon repair.    He was sent over during the office for Doppler which was found to be negative.    He was brought back over to the office and sterile dressings were reapplied and he was placed into a very well-padded short leg cast in neutral dorsiflexion.    He may do some partial weightbearing with crutches for short distances around the house but otherwise will be nonweightbearing.    I will see him back in 2 weeks no x-rays.  "

## 2020-02-21 ENCOUNTER — TELEPHONE (OUTPATIENT)
Dept: ORTHOPEDIC SURGERY | Facility: CLINIC | Age: 30
End: 2020-02-21

## 2020-02-21 NOTE — TELEPHONE ENCOUNTER
I spoke with patient he said since Monday he has had some intermittent achiness in his toes with some occasional tingling but it resolves with massage of the toes.  He is does not describe any persistent calf pain he said he had one episode where he felt a little bit of strain back there when he was massaging and pulling up on his toes but nothing that has persisted and currently when I am speaking with him is not having any symptoms at all of pain tingling or numbness in his toes or calf and has no fevers chills chest pain or shortness of breath.    We will have him continue with massaging the toes elevate this as much as possible and I will see him on Monday as scheduled to evaluate and he will bring his boot with him.  If anything worsens in the interim he will let us know.

## 2020-02-24 ENCOUNTER — OFFICE VISIT (OUTPATIENT)
Dept: ORTHOPEDIC SURGERY | Facility: CLINIC | Age: 30
End: 2020-02-24

## 2020-02-24 VITALS — HEIGHT: 68 IN | TEMPERATURE: 97.2 F | WEIGHT: 194 LBS | BODY MASS INDEX: 29.4 KG/M2

## 2020-02-24 DIAGNOSIS — S86.012D ACHILLES TENDON TEAR, LEFT, SUBSEQUENT ENCOUNTER: Primary | ICD-10-CM

## 2020-02-24 PROCEDURE — 99024 POSTOP FOLLOW-UP VISIT: CPT | Performed by: ORTHOPAEDIC SURGERY

## 2020-02-24 NOTE — PATIENT INSTRUCTIONS
[facility]  ABRAHAM GARRETT MD  [address], [address], [place] [address]    ACHILLES REPAIR REHABILITATION PROTOCOL  POST-OP GOALS    PHASE I (6-8 weeks post-op)   GOALS:  Decrease edema     Increase Scar mobility / tendon gliding     Initiate range of motion, strengthening, and proprioceptive training    1. Begin scar mobilization /edema control  2. Gentle heel cord stretching with towel  3. Range of motion for PT, inversion, and eversion  4. Gentle stretching for inversion, eversion, and PF with Thera band  5. BAPS or ankle board in sitting  6. Calf raises in sitting (soleus) for tendon gliding  7. Towel Scrunches  8. One-leg stance for proprioception / balance  9. Aquatic exercise / Hydro Track walking    PHASE II (8-12 weeks post-op)   GOALS:  Full weight bearing / normal gait     Restore functional range of motion     Improve strength and proprioception    1. Progress above treatment  2. Biking /one-leg cycling with minimal resistance  3. Calf press (light weight/high rep)  4. Retro walking for eccentric control of gastro/soleus (progress incline)  5. Gait training on treadmill  6. DF strengthening with Thera band  7. Progression of proprioceptive training (eyes open / closed)    PHASE III (12-16 weeks)   GOALS:  Restore functional strength and proprioception     Improve Endurance    1. StairMaster, NordicTrack, etc.   2. Fitter /slide board for lateral strength / proprioception  3. Progress strength / proprioception  Program              PHASE IV (16-20 weeks)   GOALS:  Prepare patient for return to activity / sports    1. Continue progression of exercises  2. Initiate jogging /running program  3. Plyometric training progression  4. Sport specific /functional drills for return to activity (cutting drills, jumping, etc.)    CRITERIA FOR RETURN TO SPORTS (5-6 months post-op)  1. Functional range of motion  2. Normal Strength / endurance of involved LE  musculature  3. Minimal Effusion / pain  4. Successful functional progression  5. Functional test within  85% of uninvolved LE

## 2020-02-25 NOTE — PROGRESS NOTES
"Ankle Follow Up      Patient: Elva Jasmine    YOB: 1990 29 y.o. male    Chief Complaints: Ankle doing okay    History of Present Illness: Patient had left Achilles repair on 1/17/2020.  He was last seen on 2/10/2020 and placed into a neutral dorsiflexion cast.  Spoke with the last week and he had some aching pain in his toes that improved with massage.    At our last visit he was sent for Doppler which was negative for DVT.    He states that the pain in his toes has improved to some degree since I spoke with him last week and feels more of a \"tickling feeling in his toes more than anything\".  HPI    ROS: ankle pain no fevers chills chest pain or shortness of breath  Past Medical History:   Diagnosis Date   • Achilles rupture, left    • Anxiety    • Atypical chest pain    • Heart palpitations    • Hypertension        Physical Exam:   Vitals:    02/24/20 1336   Temp: 97.2 °F (36.2 °C)   Weight: 88 kg (194 lb)  Comment: cast   Height: 172.7 cm (68\")     Well developed with normal mood.  Left Achilles incision continues to heal without sign of breakdown or infection.  There was some slightly diminished sensation in the distal lateral aspect of the left ankle but grossly intact light touch.  He was able to flex and extend his toes well and was grossly sensate to light touch in the toes and felt a somewhat tickly feeling\" when I touched them.  Still some slight discomfort in the calf but no focal venous cords or appreciable swelling.      Radiology: None performed      Assessment/Plan: Status post left Achilles tendon repair as outlined above.    I do not see any sign of infection or breakdown at this time or any gross neurovascular compromise.  He will continue with massaging the toes and work on gentle range of motion and may let the incision get wet in the shower but understands not to immerse it.    We will allow him to return to seated work in a week with his leg elevated and he will begin doing partial " weightbearing with 2 crutches and a double heel lift for a week and then remove one heel lift.    I have him use compression hose as well and begin physical therapy per Achilles protocol that was provided to him today.    Anything worsens he will let me know otherwise I will see him back in 2 weeks

## 2020-03-09 ENCOUNTER — OFFICE VISIT (OUTPATIENT)
Dept: ORTHOPEDIC SURGERY | Facility: CLINIC | Age: 30
End: 2020-03-09

## 2020-03-09 VITALS — HEIGHT: 68 IN | WEIGHT: 194 LBS | TEMPERATURE: 98.6 F | BODY MASS INDEX: 29.4 KG/M2

## 2020-03-09 DIAGNOSIS — S86.012D ACHILLES TENDON TEAR, LEFT, SUBSEQUENT ENCOUNTER: Primary | ICD-10-CM

## 2020-03-09 PROCEDURE — 99024 POSTOP FOLLOW-UP VISIT: CPT | Performed by: ORTHOPAEDIC SURGERY

## 2020-03-10 NOTE — PROGRESS NOTES
"Ankle Follow Up      Patient: Elva Jasmine    YOB: 1990 29 y.o. male    Chief Complaints: Ankle getting better    History of Present Illness: Patient underwent left Achilles repair on 1/17/2020.    He was last seen on 2/24/2020 with some report of aching pain in his toes and did not report that today.  At that visit he was fitted with a double heel lift with instructions on partial weightbearing with 2 crutches and his boot and removed one heel lift 1 week thereafter.    He reports that his pain has improved he gets only minimal aching along the Achilles and has not had any fall or injury.    He has started physical therapy      HPI    ROS: ankle pain no fevers chills chest pain or shortness of breath  Past Medical History:   Diagnosis Date   • Achilles rupture, left    • Anxiety    • Atypical chest pain    • Heart palpitations    • Hypertension        Physical Exam:   Vitals:    03/09/20 1428   Temp: 98.6 °F (37 °C)   Weight: 88 kg (194 lb)   Height: 172.7 cm (68\")   PainSc: 0-No pain     Well developed with normal mood.  Left Achilles incision is well-healed today there is some mild fullness along the mid substance of the Achilles with one small area that seems somewhat thinner than the rest but no palpable defects.  He has symmetric resting length and at least 4 out of 5 plantar flexion strength but did not overstress this today.  Left calf was nontender without sign of DVT      Radiology: None performed      Assessment/Plan: Status post left Achilles repair as outlined above.  Reviewed with him I do not see any clinical signs of recurrent tear and has not had any injury or change in activity that would elicit as such.    We will have him discontinue his heel lift but continue with his boot and 2 crutches for another week and if he continues to improve may then wean to one crutch under the contralateral arm.    I will see him back in 2 weeks.  "

## 2020-03-23 ENCOUNTER — OFFICE VISIT (OUTPATIENT)
Dept: ORTHOPEDIC SURGERY | Facility: CLINIC | Age: 30
End: 2020-03-23

## 2020-03-23 VITALS — TEMPERATURE: 98.2 F | BODY MASS INDEX: 29.5 KG/M2 | HEIGHT: 68 IN

## 2020-03-23 DIAGNOSIS — S86.012D ACHILLES TENDON TEAR, LEFT, SUBSEQUENT ENCOUNTER: Primary | ICD-10-CM

## 2020-03-23 PROCEDURE — 99024 POSTOP FOLLOW-UP VISIT: CPT | Performed by: ORTHOPAEDIC SURGERY

## 2020-03-24 NOTE — PROGRESS NOTES
"Ankle Follow Up      Patient: Elva Jasmine    YOB: 1990 29 y.o. male    Chief Complaints: Ankle feels okay    History of Present Illness: Patient underwent left Achilles repair on 1/17/2020.    He was last seen on 3/9/2020 and has since weaned to just 1 crutch and has been continuing with his boot and really only uses his crutch for long distances but has not been using it in the office.  He has really no significant complaints of pain at the ankle other than a slight occasional ache  HPI    ROS: ankle pain no fevers chills chest pain or shortness of breath  Past Medical History:   Diagnosis Date   • Achilles rupture, left    • Anxiety    • Atypical chest pain    • Heart palpitations    • Hypertension        Physical Exam:   Vitals:    03/23/20 1454   Temp: 98.2 °F (36.8 °C)   TempSrc: Temporal   Height: 172.7 cm (68\")   PainSc: 0-No pain     Well developed with normal mood.  On examination of the left hindfoot his incision is well-healed in a supine position he had exactly symmetric resting length and no palpable defects of the Achilles but with some areas of fullness along the mid substance in one area that seems somewhat thinner than the rest but he had good plantarflexion strength       Radiology: None performed      Assessment/Plan: Status post left Achilles repair as outlined above.    He was worried about his Achilles \"healing in an elongated fashion\".    I reviewed with him that this is why we had him in a boot protecting him and he has exactly symmetric resting length and has not had any injury sweats do not see any indication for further work-up or further surgical treatment at this time and he is in agreement with that.    We will have him continue with weaning completely off his crutches over the next week and if he does well with that he may then go to just his boot for another 10 days or so and if he is without complaints may then wean out of his boot around the house but understands not " to go barefoot and to use an athletic shoe at least with a small heel to it.  He will continue with physical therapy per Achilles protocol    If he has any return of pain or change in symptoms he will let me know otherwise I will see him back in 3 weeks.

## 2020-03-31 ENCOUNTER — TELEPHONE (OUTPATIENT)
Dept: FAMILY MEDICINE CLINIC | Facility: CLINIC | Age: 30
End: 2020-03-31

## 2020-03-31 ENCOUNTER — E-VISIT (OUTPATIENT)
Dept: FAMILY MEDICINE CLINIC | Facility: TELEHEALTH | Age: 30
End: 2020-03-31

## 2020-03-31 DIAGNOSIS — J06.9 VIRAL UPPER RESPIRATORY TRACT INFECTION: Primary | ICD-10-CM

## 2020-03-31 PROCEDURE — 99421 OL DIG E/M SVC 5-10 MIN: CPT | Performed by: NURSE PRACTITIONER

## 2020-03-31 RX ORDER — BROMPHENIRAMINE MALEATE, PSEUDOEPHEDRINE HYDROCHLORIDE, AND DEXTROMETHORPHAN HYDROBROMIDE 2; 30; 10 MG/5ML; MG/5ML; MG/5ML
5 SYRUP ORAL 4 TIMES DAILY PRN
Qty: 120 ML | Refills: 0 | Status: SHIPPED | OUTPATIENT
Start: 2020-03-31

## 2020-03-31 RX ORDER — AMOXICILLIN 875 MG/1
875 TABLET, COATED ORAL 2 TIMES DAILY
Qty: 14 TABLET | Refills: 0 | Status: SHIPPED | OUTPATIENT
Start: 2020-03-31 | End: 2020-04-07

## 2020-03-31 NOTE — PROGRESS NOTES
Subjective   Elva Jasmine is a 29 y.o. male.     History of Present Illness     Patient reports fever, cough (productive), scratchy throat, headache and runny nose for more than a week duration. Fever reported to be less than 100.4 degrees.  Reports contact with known 2019 Novel Coronavirus/anyone tested for the virus.    The following portions of the patient's history were reviewed and updated as appropriate: allergies, current medications, past family history, past medical history, past social history, past surgical history and problem list.    Review of Systems   Negative as except for noted in HPI       have reviewed the e-Visit questionnaire and patient's answers, my assessment and plan are as follows:    Assessment/Plan   Diagnoses and all orders for this visit:    Viral upper respiratory tract infection    Other orders  -     brompheniramine-pseudoephedrine-DM 30-2-10 MG/5ML syrup; Take 5 mL by mouth 4 (Four) Times a Day As Needed for Allergies.  -     amoxicillin (AMOXIL) 875 MG tablet; Take 1 tablet by mouth 2 (Two) Times a Day for 7 days.      Take antibiotics as directed, use cough medication as directed. OK to use over the counter tylenol as directed for fever/body aches. Based on symptoms would recommend self quarantine from date of onset of symptoms.Follow up with primary care physician for any worsening.               Merry Lind, APRN  03/31/20  10:51 AM

## 2020-03-31 NOTE — PATIENT INSTRUCTIONS
Take antibiotics as directed, use cough medication as directed. OK to use over the counter tylenol as directed for fever/body aches. Based on symptoms would recommend self quarantine from date of onset of symptoms.Follow up with primary care physician for any worsening.

## 2020-03-31 NOTE — TELEPHONE ENCOUNTER
Patient called saying he has a mild cough and did have low grade fever last night. He wanted to be tested for covid 19. He was around someone who was around someone else who tested positive. He was informed unless severely ill he would not be considered for testing at point and should self quarantine for 14 days from date of exposure. BRITTNEE

## 2020-04-13 ENCOUNTER — OFFICE VISIT (OUTPATIENT)
Dept: ORTHOPEDIC SURGERY | Facility: CLINIC | Age: 30
End: 2020-04-13

## 2020-04-13 VITALS — BODY MASS INDEX: 26.83 KG/M2 | TEMPERATURE: 98.5 F | WEIGHT: 177 LBS | HEIGHT: 68 IN

## 2020-04-13 DIAGNOSIS — S86.012D ACHILLES TENDON TEAR, LEFT, SUBSEQUENT ENCOUNTER: Primary | ICD-10-CM

## 2020-04-13 PROCEDURE — 99024 POSTOP FOLLOW-UP VISIT: CPT | Performed by: ORTHOPAEDIC SURGERY

## 2020-04-13 NOTE — PROGRESS NOTES
"Ankle Follow Up      Patient: Elva Jasmine    YOB: 1990 29 y.o. male    Chief Complaints: Ankle getting better     History of Present Illness: patient follows up left Achilles repair from 1/17/2020.    He was last seen on 3/23/2020 and has since weaned out of his boot and off of his crutches with really no appreciable complaints of pain other than a slight ache with pushoff while walking.  He has continue to do physical therapy  HPI    ROS: ankle pain  Past Medical History:   Diagnosis Date   • Achilles rupture, left    • Anxiety    • Atypical chest pain    • Heart palpitations    • Hypertension        Physical Exam:   Vitals:    04/13/20 1114   Temp: 98.5 °F (36.9 °C)   TempSrc: Temporal   Weight: 80.3 kg (177 lb)   Height: 172.7 cm (68\")   PainSc: 0-No pain   PainLoc: Ankle     Well developed with normal mood.  On exam he has a nonantalgic gait.  Left Achilles incision is well-healed there is no palpable defects along the mid substance of the Achilles and really no focal tenderness to palpation.  In the supine position he had exactly symmetric resting length at least 4+ out of 5 plantar flexion strength but did not overstress this.  There were 2 cm of calf atrophy on the left at 37.5 versus 39.5 on the right.      Radiology: None performed      Assessment/Plan: Status post left Achilles repair    Reviewed with him that the calf atrophy should improve over time and takes sometimes 12 to 18 months to see how well is really can I do any feels like it has improved since her last visit.    We will avoid any running jumping or cutting activities and walk for activities of daily living only continue with physical therapy and if anything worsens to let me know otherwise I will see him back in 4 weeks.  "

## 2020-05-13 ENCOUNTER — OFFICE VISIT (OUTPATIENT)
Dept: ORTHOPEDIC SURGERY | Facility: CLINIC | Age: 30
End: 2020-05-13

## 2020-05-13 VITALS — BODY MASS INDEX: 27.74 KG/M2 | WEIGHT: 183 LBS | TEMPERATURE: 98 F | HEIGHT: 68 IN

## 2020-05-13 DIAGNOSIS — S86.012D ACHILLES TENDON TEAR, LEFT, SUBSEQUENT ENCOUNTER: Primary | ICD-10-CM

## 2020-05-13 PROCEDURE — 99212 OFFICE O/P EST SF 10 MIN: CPT | Performed by: ORTHOPAEDIC SURGERY

## 2020-05-13 NOTE — PROGRESS NOTES
"Ankle Follow Up      Patient: Elva Jasmine    YOB: 1990 30 y.o. male    Chief Complaints: Ankle doing well    History of Present Illness: Patient follows up left Achilles repair from 1/17/2020.  He was last seen on 4/13/2020 and has continued to improve.  He feels he is getting stronger and better each week and been doing physical therapy.  He gets a little bit of stiffness and achiness after rising and walking from a prolonged seated position or lying down but then it \"works itself out\" and does not have any pain.   HPI    ROS: ankle pain  Past Medical History:   Diagnosis Date   • Achilles rupture, left    • Anxiety    • Atypical chest pain    • Heart palpitations    • Hypertension        Physical Exam:   Vitals:    05/13/20 0819   Temp: 98 °F (36.7 °C)   TempSrc: Temporal   Weight: 83 kg (183 lb)   Height: 172.7 cm (68\")   PainSc: 0-No pain     Well developed with normal mood.  On exam his incision remains well-healed with no palpable defects of the Achilles.  There was no focal tenderness along the Achilles itself and he demonstrated good plantarflexion strength against resistance.  Left calf was nontender and measured 38 cm (up from 37.5 at last visit) versus right calf at 39.5 cm.      Radiology: None performed      Assessment/Plan: Status post left Achilles tendon repair.    Overall he is doing well and seems to be pleased with his outcome.  He will continue with physical therapy and work on strengthening and understands it may take quite some time for the calf atrophy to resolve and may never completely resolved.  He will avoid any running or jumping activities other than that involved with physical therapy and I will see him back in 2 months for final check.  "

## 2020-07-15 ENCOUNTER — OFFICE VISIT (OUTPATIENT)
Dept: ORTHOPEDIC SURGERY | Facility: CLINIC | Age: 30
End: 2020-07-15

## 2020-07-15 VITALS — TEMPERATURE: 98.1 F | WEIGHT: 186.8 LBS | BODY MASS INDEX: 28.31 KG/M2 | HEIGHT: 68 IN

## 2020-07-15 DIAGNOSIS — S86.012D ACHILLES TENDON TEAR, LEFT, SUBSEQUENT ENCOUNTER: Primary | ICD-10-CM

## 2020-07-15 DIAGNOSIS — M62.562 ATROPHY OF CALF MUSCLES ON LEFT: ICD-10-CM

## 2020-07-15 PROCEDURE — 99213 OFFICE O/P EST LOW 20 MIN: CPT | Performed by: ORTHOPAEDIC SURGERY

## 2020-07-15 NOTE — PROGRESS NOTES
"Ankle Follow Up      Patient: Elva Jasmine    YOB: 1990 30 y.o. male    Chief Complaints: Ankle feels good    History of Present Illness: Patient follows up left Achilles repair from 1/17/2020.    He completed therapy 2 weeks ago and has no complaints of pain.  Feel like his strength is improving but still feels somewhat weak with heel raises and with running on pavement but does not really bother him much if any on a treadmill.      HPI    ROS: No ankle pain  Past Medical History:   Diagnosis Date   • Achilles rupture, left    • Anxiety    • Atypical chest pain    • Heart palpitations    • Hypertension        Physical Exam:   Vitals:    07/15/20 0801   Temp: 98.1 °F (36.7 °C)   Weight: 84.7 kg (186 lb 12.8 oz)   Height: 172.7 cm (68\")   PainSc: 0-No pain     Well developed with normal mood.  Left Achilles incision remains well-healed there is no palpable defect.  He has 5 out of 5 flexion strength.  Left calf measures about 38 cm right measures 39.5      Radiology: None performed      Assessment/Plan: Status post left Achilles repair with some residual calf atrophy     Overall he seems to be doing well and is functional. Reviewed with him that the calf atrophy can take a long time to resolve and may never completely resolve but expect that his strength will continue to improve even up to 12 to 18 months out from surgery.    Overall he is pleased with his outcome and will continue with working on strengthening exercises on his own.    Anything worsens let me know otherwise I will see him back as needed.  "

## 2020-08-12 ENCOUNTER — OFFICE (OUTPATIENT)
Dept: URBAN - METROPOLITAN AREA CLINIC 66 | Facility: CLINIC | Age: 30
End: 2020-08-12

## 2020-08-12 VITALS
WEIGHT: 186 LBS | HEIGHT: 68 IN | DIASTOLIC BLOOD PRESSURE: 70 MMHG | TEMPERATURE: 97.8 F | HEART RATE: 68 BPM | SYSTOLIC BLOOD PRESSURE: 115 MMHG

## 2020-08-12 DIAGNOSIS — R07.9 CHEST PAIN, UNSPECIFIED: ICD-10-CM

## 2020-08-12 DIAGNOSIS — A04.9 BACTERIAL INTESTINAL INFECTION, UNSPECIFIED: ICD-10-CM

## 2020-08-12 DIAGNOSIS — R11.0 NAUSEA: ICD-10-CM

## 2020-08-12 PROCEDURE — 99202 OFFICE O/P NEW SF 15 MIN: CPT | Performed by: INTERNAL MEDICINE

## 2021-04-16 ENCOUNTER — BULK ORDERING (OUTPATIENT)
Dept: CASE MANAGEMENT | Facility: OTHER | Age: 31
End: 2021-04-16

## 2021-04-16 DIAGNOSIS — Z23 IMMUNIZATION DUE: ICD-10-CM

## 2025-01-05 NOTE — ANESTHESIA PROCEDURE NOTES
Airway  Urgency: elective    Date/Time: 1/17/2020 7:28 AM  Airway not difficult    General Information and Staff    Patient location during procedure: OR  Anesthesiologist: Daryn Scott MD  CRNA: Angela Penn CRNA    Indications and Patient Condition  Indications for airway management: airway protection    Preoxygenated: yes  Mask difficulty assessment: 1 - vent by mask    Final Airway Details  Final airway type: endotracheal airway      Successful airway: ETT  Cuffed: yes   Successful intubation technique: direct laryngoscopy  Endotracheal tube insertion site: oral  Blade: Anne-Marie  Blade size: 4  ETT size (mm): 7.5  Cormack-Lehane Classification: grade I - full view of glottis  Placement verified by: chest auscultation and capnometry   Measured from: lips  ETT/EBT  to lips (cm): 21  Number of attempts at approach: 1  Assessment: lips, teeth, and gum same as pre-op and atraumatic intubation    Additional Comments  Smooth IV/mask induction/intubation. Easy bag-mask ventilation. Orally intubated, easy, atraumatic, lips/teeth/mouth left intact, as preop. Direct visual of vocal cords. +ETCO2, bilateral breath sounds and equal.             Chart reviewed for pt who is unable to complete Malnutrition Screen Tool (MST) at this time. No nutrition intervention appears indicated at this time. RD available via consult. Will follow per policy.

## (undated) DEVICE — PAD,ABDOMINAL,8"X10",ST,LF: Brand: MEDLINE

## (undated) DEVICE — STPLR SKIN VISISTAT WD 35CT

## (undated) DEVICE — TOWEL,OR,DSP,ST,BLUE,STD,4/PK,20PK/CS: Brand: MEDLINE

## (undated) DEVICE — APPL CHLORAPREP W/TINT 26ML ORNG

## (undated) DEVICE — BNDG ELAS ELITE V/CLOSE 4IN 5YD LF STRL

## (undated) DEVICE — UNDERCAST PADDING: Brand: DEROYAL

## (undated) DEVICE — SPNG LAP 18X18IN LF STRL PK/5

## (undated) DEVICE — ELECTRD NDL EZ CLN MOD 2.75IN

## (undated) DEVICE — GLV SURG BIOGEL LTX PF 8

## (undated) DEVICE — DISPOSABLE TOURNIQUET CUFF SINGLE BLADDER, SINGLE PORT AND QUICK CONNECT CONNECTOR: Brand: COLOR CUFF

## (undated) DEVICE — GOWN,NON-REINFORCED,SIRUS,SET IN SLV,XXL: Brand: MEDLINE

## (undated) DEVICE — GAUZE,SPONGE,FLUFF,6"X6.75",STRL,10/TRAY: Brand: MEDLINE

## (undated) DEVICE — DRSNG GZ PETROLTM XEROFORM CURAD 1X8IN STRL

## (undated) DEVICE — SYS PERFUS SEP PLATLT W TIPS CUST

## (undated) DEVICE — PK ORTHO MINOR TOWER 40

## (undated) DEVICE — GLV SURG TRIUMPH CLASSIC PF LTX 8 STRL

## (undated) DEVICE — SUT VIC 2/0 SH 27IN

## (undated) DEVICE — SKIN PREP TRAY W/CHG: Brand: MEDLINE INDUSTRIES, INC.